# Patient Record
Sex: FEMALE | Race: BLACK OR AFRICAN AMERICAN | NOT HISPANIC OR LATINO | Employment: OTHER | URBAN - METROPOLITAN AREA
[De-identification: names, ages, dates, MRNs, and addresses within clinical notes are randomized per-mention and may not be internally consistent; named-entity substitution may affect disease eponyms.]

---

## 2023-02-06 PROBLEM — I10 HYPERTENSION: Status: ACTIVE | Noted: 2023-02-06

## 2023-02-06 PROBLEM — E55.9 MILD VITAMIN D DEFICIENCY: Status: ACTIVE | Noted: 2023-02-06

## 2023-02-06 PROBLEM — H40.1131 PRIMARY OPEN ANGLE GLAUCOMA (POAG) OF BOTH EYES, MILD STAGE: Status: ACTIVE | Noted: 2023-02-06

## 2023-02-06 PROBLEM — E11.9 TYPE 2 DIABETES MELLITUS (MULTI): Status: ACTIVE | Noted: 2023-02-06

## 2023-02-06 PROBLEM — M46.1 SACROILIAC INFLAMMATION (CMS-HCC): Status: ACTIVE | Noted: 2023-02-06

## 2023-02-06 PROBLEM — R07.9 CHEST PAIN: Status: ACTIVE | Noted: 2023-02-06

## 2023-02-06 PROBLEM — H04.123 DRY EYES: Status: ACTIVE | Noted: 2023-02-06

## 2023-02-06 PROBLEM — H26.9 CATARACT: Status: ACTIVE | Noted: 2023-02-06

## 2023-02-06 PROBLEM — H40.059 OHT (OCULAR HYPERTENSION): Status: ACTIVE | Noted: 2023-02-06

## 2023-02-06 PROBLEM — M25.512 LEFT SHOULDER PAIN: Status: ACTIVE | Noted: 2023-02-06

## 2023-02-06 PROBLEM — E11.9 DIABETES MELLITUS TYPE 2 WITHOUT RETINOPATHY (MULTI): Status: ACTIVE | Noted: 2023-02-06

## 2023-02-06 PROBLEM — R06.02 EXERTIONAL SHORTNESS OF BREATH: Status: ACTIVE | Noted: 2023-02-06

## 2023-02-06 PROBLEM — Z96.1 BILATERAL PSEUDOPHAKIA: Status: ACTIVE | Noted: 2023-02-06

## 2023-02-06 PROBLEM — R20.2 PARESTHESIA OF BOTH HANDS: Status: ACTIVE | Noted: 2023-02-06

## 2023-02-06 PROBLEM — H43.813 PVD (POSTERIOR VITREOUS DETACHMENT), BOTH EYES: Status: ACTIVE | Noted: 2023-02-06

## 2023-02-06 PROBLEM — R49.0 DYSPHONIA: Status: ACTIVE | Noted: 2023-02-06

## 2023-02-06 PROBLEM — E01.0 THYROMEGALY: Status: ACTIVE | Noted: 2023-02-06

## 2023-02-06 PROBLEM — H25.813 COMBINED FORM OF AGE-RELATED CATARACT, BOTH EYES: Status: ACTIVE | Noted: 2023-02-06

## 2023-02-06 PROBLEM — H25.813 MIXED TYPE AGE-RELATED CATARACT, BOTH EYES: Status: ACTIVE | Noted: 2023-02-06

## 2023-02-06 PROBLEM — K21.9 ESOPHAGEAL REFLUX: Status: ACTIVE | Noted: 2023-02-06

## 2023-02-06 PROBLEM — N64.4 BREAST PAIN, LEFT: Status: ACTIVE | Noted: 2023-02-06

## 2023-02-06 PROBLEM — H43.393 VITREOUS SYNERESIS OF BOTH EYES: Status: ACTIVE | Noted: 2023-02-06

## 2023-02-06 PROBLEM — H40.003 GLAUCOMA SUSPECT, BOTH EYES: Status: ACTIVE | Noted: 2023-02-06

## 2023-02-06 PROBLEM — H43.813 POSTERIOR VITREOUS DETACHMENT OF BOTH EYES: Status: ACTIVE | Noted: 2023-02-06

## 2023-02-06 PROBLEM — E78.5 HYPERLIPIDEMIA LDL GOAL <100: Status: ACTIVE | Noted: 2023-02-06

## 2023-02-06 PROBLEM — R53.83 FATIGUE: Status: ACTIVE | Noted: 2023-02-06

## 2023-02-06 PROBLEM — J45.909 ASTHMA (HHS-HCC): Status: ACTIVE | Noted: 2023-02-06

## 2023-02-06 RX ORDER — PANTOPRAZOLE SODIUM 40 MG/1
1 TABLET, DELAYED RELEASE ORAL DAILY
COMMUNITY
Start: 2021-03-23 | End: 2023-05-01

## 2023-02-06 RX ORDER — SIMVASTATIN 20 MG/1
1 TABLET, FILM COATED ORAL DAILY
COMMUNITY
Start: 2015-09-16 | End: 2023-05-01

## 2023-02-06 RX ORDER — LISINOPRIL AND HYDROCHLOROTHIAZIDE 20; 25 MG/1; MG/1
1 TABLET ORAL DAILY
COMMUNITY
Start: 2016-01-13 | End: 2023-05-12 | Stop reason: SDUPTHER

## 2023-02-06 RX ORDER — DULAGLUTIDE 1.5 MG/.5ML
1.5 INJECTION, SOLUTION SUBCUTANEOUS
COMMUNITY
Start: 2022-05-18 | End: 2023-11-13 | Stop reason: DRUGHIGH

## 2023-02-06 RX ORDER — PEN NEEDLE, DIABETIC 30 GX3/16"
1 NEEDLE, DISPOSABLE MISCELLANEOUS DAILY
COMMUNITY

## 2023-02-06 RX ORDER — LANCETS 33 GAUGE
1 EACH MISCELLANEOUS 3 TIMES DAILY
COMMUNITY

## 2023-02-06 RX ORDER — BLOOD SUGAR DIAGNOSTIC
1 STRIP MISCELLANEOUS 3 TIMES DAILY
COMMUNITY
Start: 2021-09-15 | End: 2023-10-18

## 2023-02-06 RX ORDER — INSULIN GLARGINE 100 [IU]/ML
10 INJECTION, SOLUTION SUBCUTANEOUS DAILY
COMMUNITY

## 2023-02-06 RX ORDER — CYCLOBENZAPRINE HCL 10 MG
10 TABLET ORAL NIGHTLY
COMMUNITY
End: 2023-07-18

## 2023-02-06 RX ORDER — METFORMIN HYDROCHLORIDE 1000 MG/1
1 TABLET ORAL
COMMUNITY
Start: 2016-01-13 | End: 2023-10-25 | Stop reason: SDUPTHER

## 2023-02-06 RX ORDER — ASPIRIN 81 MG/1
1 TABLET ORAL DAILY
COMMUNITY
Start: 2016-01-13

## 2023-02-06 RX ORDER — GLIPIZIDE 10 MG/1
10 TABLET ORAL
COMMUNITY
Start: 2016-01-13

## 2023-02-06 RX ORDER — PEN NEEDLE, DIABETIC 30 GX3/16"
1 NEEDLE, DISPOSABLE MISCELLANEOUS DAILY
COMMUNITY
End: 2023-10-25 | Stop reason: SDUPTHER

## 2023-02-06 RX ORDER — ACETAMINOPHEN 500 MG
TABLET ORAL
COMMUNITY
Start: 2020-06-18

## 2023-02-06 RX ORDER — LATANOPROST 50 UG/ML
1 SOLUTION/ DROPS OPHTHALMIC NIGHTLY
COMMUNITY
Start: 2022-03-11 | End: 2023-10-19 | Stop reason: SDUPTHER

## 2023-02-06 RX ORDER — ALBUTEROL SULFATE 90 UG/1
2 AEROSOL, METERED RESPIRATORY (INHALATION) EVERY 4 HOURS PRN
COMMUNITY
Start: 2016-01-13 | End: 2023-07-18 | Stop reason: SDUPTHER

## 2023-02-06 RX ORDER — AMLODIPINE BESYLATE 10 MG/1
1 TABLET ORAL DAILY
COMMUNITY
Start: 2016-01-13 | End: 2023-03-16

## 2023-02-06 RX ORDER — LIDOCAINE 50 MG/G
PATCH TOPICAL
COMMUNITY

## 2023-02-06 RX ORDER — METOPROLOL SUCCINATE 50 MG/1
50 TABLET, EXTENDED RELEASE ORAL DAILY
COMMUNITY
End: 2023-07-06

## 2023-03-12 DIAGNOSIS — I10 PRIMARY HYPERTENSION: Primary | ICD-10-CM

## 2023-03-16 RX ORDER — AMLODIPINE BESYLATE 10 MG/1
TABLET ORAL
Qty: 90 TABLET | Refills: 0 | Status: SHIPPED | OUTPATIENT
Start: 2023-03-16 | End: 2023-06-12

## 2023-03-20 ENCOUNTER — APPOINTMENT (OUTPATIENT)
Dept: PRIMARY CARE | Facility: CLINIC | Age: 78
End: 2023-03-20
Payer: MEDICARE

## 2023-04-29 DIAGNOSIS — E78.5 HYPERLIPIDEMIA LDL GOAL <100: Primary | ICD-10-CM

## 2023-04-29 DIAGNOSIS — K21.9 GASTROESOPHAGEAL REFLUX DISEASE, UNSPECIFIED WHETHER ESOPHAGITIS PRESENT: ICD-10-CM

## 2023-05-01 RX ORDER — SIMVASTATIN 20 MG/1
TABLET, FILM COATED ORAL
Qty: 90 TABLET | Refills: 0 | Status: SHIPPED | OUTPATIENT
Start: 2023-05-01 | End: 2023-07-31 | Stop reason: SDUPTHER

## 2023-05-01 RX ORDER — PANTOPRAZOLE SODIUM 40 MG/1
TABLET, DELAYED RELEASE ORAL
Qty: 90 TABLET | Refills: 0 | Status: SHIPPED | OUTPATIENT
Start: 2023-05-01 | End: 2023-07-31 | Stop reason: SDUPTHER

## 2023-05-12 DIAGNOSIS — I10 HYPERTENSION, UNSPECIFIED TYPE: Primary | ICD-10-CM

## 2023-05-12 RX ORDER — LISINOPRIL AND HYDROCHLOROTHIAZIDE 20; 25 MG/1; MG/1
1 TABLET ORAL DAILY
Qty: 90 TABLET | Refills: 0 | Status: SHIPPED | OUTPATIENT
Start: 2023-05-12 | End: 2023-07-31 | Stop reason: SDUPTHER

## 2023-05-18 LAB
ALBUMIN (MG/L) IN URINE: 19.9 MG/L
ALBUMIN/CREATININE (UG/MG) IN URINE: 17 UG/MG CRT (ref 0–30)
CREATININE (MG/DL) IN URINE: 117 MG/DL (ref 20–320)
ESTIMATED AVERAGE GLUCOSE FOR HBA1C: 186 MG/DL
HEMOGLOBIN A1C/HEMOGLOBIN TOTAL IN BLOOD: 8.1 %

## 2023-06-12 DIAGNOSIS — I10 PRIMARY HYPERTENSION: ICD-10-CM

## 2023-06-12 RX ORDER — AMLODIPINE BESYLATE 10 MG/1
TABLET ORAL
Qty: 90 TABLET | Refills: 0 | Status: SHIPPED | OUTPATIENT
Start: 2023-06-12 | End: 2023-09-11

## 2023-07-04 DIAGNOSIS — I10 PRIMARY HYPERTENSION: Primary | ICD-10-CM

## 2023-07-06 RX ORDER — METOPROLOL SUCCINATE 50 MG/1
TABLET, EXTENDED RELEASE ORAL
Qty: 90 TABLET | Refills: 0 | Status: SHIPPED | OUTPATIENT
Start: 2023-07-06 | End: 2023-07-18 | Stop reason: SDUPTHER

## 2023-07-18 ENCOUNTER — OFFICE VISIT (OUTPATIENT)
Dept: PRIMARY CARE | Facility: CLINIC | Age: 78
End: 2023-07-18
Payer: MEDICARE

## 2023-07-18 VITALS
TEMPERATURE: 97.6 F | RESPIRATION RATE: 16 BRPM | HEIGHT: 62 IN | OXYGEN SATURATION: 94 % | DIASTOLIC BLOOD PRESSURE: 62 MMHG | WEIGHT: 181 LBS | SYSTOLIC BLOOD PRESSURE: 108 MMHG | HEART RATE: 70 BPM | BODY MASS INDEX: 33.31 KG/M2

## 2023-07-18 DIAGNOSIS — J45.20 MILD INTERMITTENT ASTHMA WITHOUT COMPLICATION (HHS-HCC): ICD-10-CM

## 2023-07-18 DIAGNOSIS — E78.5 HYPERLIPIDEMIA LDL GOAL <100: Primary | ICD-10-CM

## 2023-07-18 DIAGNOSIS — E11.9 DIABETES MELLITUS TYPE 2 WITHOUT RETINOPATHY (MULTI): ICD-10-CM

## 2023-07-18 DIAGNOSIS — I10 PRIMARY HYPERTENSION: ICD-10-CM

## 2023-07-18 DIAGNOSIS — Z12.31 ENCOUNTER FOR SCREENING MAMMOGRAM FOR MALIGNANT NEOPLASM OF BREAST: ICD-10-CM

## 2023-07-18 DIAGNOSIS — G47.33 OSA (OBSTRUCTIVE SLEEP APNEA): ICD-10-CM

## 2023-07-18 DIAGNOSIS — Z12.11 ENCOUNTER FOR SCREENING FOR MALIGNANT NEOPLASM OF COLON: ICD-10-CM

## 2023-07-18 PROBLEM — R00.0 TACHYCARDIA: Status: ACTIVE | Noted: 2023-07-18

## 2023-07-18 PROBLEM — M62.830 SPASM OF THORACIC BACK MUSCLE: Status: ACTIVE | Noted: 2023-07-18

## 2023-07-18 PROCEDURE — 1126F AMNT PAIN NOTED NONE PRSNT: CPT | Performed by: INTERNAL MEDICINE

## 2023-07-18 PROCEDURE — 3074F SYST BP LT 130 MM HG: CPT | Performed by: INTERNAL MEDICINE

## 2023-07-18 PROCEDURE — 1170F FXNL STATUS ASSESSED: CPT | Performed by: INTERNAL MEDICINE

## 2023-07-18 PROCEDURE — G0439 PPPS, SUBSEQ VISIT: HCPCS | Performed by: INTERNAL MEDICINE

## 2023-07-18 PROCEDURE — 3078F DIAST BP <80 MM HG: CPT | Performed by: INTERNAL MEDICINE

## 2023-07-18 PROCEDURE — 99397 PER PM REEVAL EST PAT 65+ YR: CPT | Performed by: INTERNAL MEDICINE

## 2023-07-18 PROCEDURE — 1036F TOBACCO NON-USER: CPT | Performed by: INTERNAL MEDICINE

## 2023-07-18 RX ORDER — METOPROLOL SUCCINATE 50 MG/1
50 TABLET, EXTENDED RELEASE ORAL DAILY
Qty: 90 TABLET | Refills: 1 | Status: SHIPPED | OUTPATIENT
Start: 2023-07-18 | End: 2023-10-05 | Stop reason: SDUPTHER

## 2023-07-18 RX ORDER — FLUTICASONE PROPIONATE 50 MCG
2 SPRAY, SUSPENSION (ML) NASAL DAILY
COMMUNITY
Start: 2014-05-15

## 2023-07-18 RX ORDER — ALBUTEROL SULFATE 90 UG/1
2 AEROSOL, METERED RESPIRATORY (INHALATION) EVERY 4 HOURS PRN
Qty: 18 G | Refills: 11 | Status: SHIPPED | OUTPATIENT
Start: 2023-07-18

## 2023-07-18 RX ORDER — DEXTROSE 4 G
TABLET,CHEWABLE ORAL
COMMUNITY
Start: 2015-04-21 | End: 2023-10-25 | Stop reason: ALTCHOICE

## 2023-07-18 ASSESSMENT — ENCOUNTER SYMPTOMS
BLOOD IN STOOL: 0
CONSTIPATION: 0
OCCASIONAL FEELINGS OF UNSTEADINESS: 0
VOMITING: 0
LIGHT-HEADEDNESS: 0
NECK PAIN: 0
SINUS PRESSURE: 0
BACK PAIN: 0
CHILLS: 0
MYALGIAS: 0
NUMBNESS: 0
HEMATURIA: 0
LOSS OF SENSATION IN FEET: 0
DIFFICULTY URINATING: 0
ARTHRALGIAS: 0
SORE THROAT: 0
COUGH: 0
FEVER: 0
SHORTNESS OF BREATH: 0
NAUSEA: 0
NECK STIFFNESS: 0
JOINT SWELLING: 0
HEADACHES: 0
FREQUENCY: 0
PALPITATIONS: 0
ABDOMINAL PAIN: 0
DIAPHORESIS: 0
DYSURIA: 0
WHEEZING: 0
FATIGUE: 0
DIARRHEA: 0
APPETITE CHANGE: 0
WEAKNESS: 0
ABDOMINAL DISTENTION: 0
DEPRESSION: 0
RHINORRHEA: 0
DIZZINESS: 0

## 2023-07-18 ASSESSMENT — PATIENT HEALTH QUESTIONNAIRE - PHQ9
2. FEELING DOWN, DEPRESSED OR HOPELESS: NOT AT ALL
2. FEELING DOWN, DEPRESSED OR HOPELESS: NOT AT ALL
1. LITTLE INTEREST OR PLEASURE IN DOING THINGS: NOT AT ALL
SUM OF ALL RESPONSES TO PHQ9 QUESTIONS 1 AND 2: 0
SUM OF ALL RESPONSES TO PHQ9 QUESTIONS 1 AND 2: 0
1. LITTLE INTEREST OR PLEASURE IN DOING THINGS: NOT AT ALL

## 2023-07-18 ASSESSMENT — ACTIVITIES OF DAILY LIVING (ADL)
BATHING: INDEPENDENT
MANAGING_FINANCES: INDEPENDENT
DOING_HOUSEWORK: INDEPENDENT
GROCERY_SHOPPING: INDEPENDENT
TAKING_MEDICATION: INDEPENDENT
DRESSING: INDEPENDENT

## 2023-07-18 NOTE — ASSESSMENT & PLAN NOTE
Diabetes Mellitus Type II  - HbA1C above target goal 7%  - Monitor HbA1C, renal function  - Continue current antidiabetics   -Make apt with Ophthalmology  -Educate about adverse effects of uncontrolled DMT2 and its complications, self glucose monitoring and diary keeping to bring to the next visit

## 2023-07-18 NOTE — PROGRESS NOTES
"Subjective   Patient ID: Nii Colunga is a 77 y.o. female who presents for Follow-up (Medicare Wellness visit).    HPI   She has no new medical complaints.       Review of Systems   Constitutional:  Negative for appetite change, chills, diaphoresis, fatigue and fever.   HENT:  Negative for congestion, ear discharge, ear pain, hearing loss, postnasal drip, rhinorrhea, sinus pressure, sore throat and tinnitus.    Eyes:  Negative for visual disturbance.   Respiratory:  Negative for cough, shortness of breath and wheezing.    Cardiovascular:  Negative for chest pain, palpitations and leg swelling.   Gastrointestinal:  Negative for abdominal distention, abdominal pain, blood in stool, constipation, diarrhea, nausea and vomiting.   Genitourinary:  Negative for decreased urine volume, difficulty urinating, dysuria, frequency, hematuria and urgency.   Musculoskeletal:  Negative for arthralgias, back pain, gait problem, joint swelling, myalgias, neck pain and neck stiffness.   Skin:  Negative for rash.   Neurological:  Negative for dizziness, weakness, light-headedness, numbness and headaches.         Objective   /62 (BP Location: Right arm, Patient Position: Sitting, BP Cuff Size: Adult)   Pulse 70   Temp 36.4 °C (97.6 °F) (Oral)   Resp 16   Ht 1.575 m (5' 2\")   Wt 82.1 kg (181 lb)   SpO2 94%   BMI 33.11 kg/m²     Physical Exam  Vitals reviewed.   Constitutional:       Appearance: Normal appearance. She is normal weight.   HENT:      Right Ear: Tympanic membrane and external ear normal.      Left Ear: Tympanic membrane and external ear normal.   Eyes:      Extraocular Movements: Extraocular movements intact.      Conjunctiva/sclera: Conjunctivae normal.      Pupils: Pupils are equal, round, and reactive to light.   Cardiovascular:      Rate and Rhythm: Normal rate and regular rhythm.      Pulses: Normal pulses.   Pulmonary:      Effort: Pulmonary effort is normal.      Breath sounds: Normal breath sounds. "   Abdominal:      General: Bowel sounds are normal.      Palpations: Abdomen is soft.   Musculoskeletal:         General: Normal range of motion.      Cervical back: Normal range of motion.   Skin:     General: Skin is warm and dry.   Neurological:      General: No focal deficit present.      Mental Status: She is oriented to person, place, and time.   Psychiatric:         Mood and Affect: Mood normal.         Behavior: Behavior normal.           Assessment/Plan   Problem List Items Addressed This Visit       Asthma    Relevant Medications    albuterol (ProAir HFA) 90 mcg/actuation inhaler    Diabetes mellitus type 2 without retinopathy (CMS/HCC)     Diabetes Mellitus Type II  - HbA1C above target goal 7%  - Monitor HbA1C, renal function  - Continue current antidiabetics   -Make apt with Ophthalmology  -Educate about adverse effects of uncontrolled DMT2 and its complications, self glucose monitoring and diary keeping to bring to the next visit           Relevant Orders    Comprehensive Metabolic Panel    Hyperlipidemia LDL goal <100 - Primary    Relevant Orders    Lipid Panel    Hypertension     -BP below target goal 130/80  -CMP, TSH ordered  -Continue current antihypertensives  -Educated about adverse effects of uncontrolled blood pressure, importance of self blood pressure monitoring and bring to next visit.  -Counselled low sodium diet, mediterranean diet and exercise            Relevant Medications    metoprolol succinate XL (Toprol-XL) 50 mg 24 hr tablet    Other Relevant Orders    CBC and Auto Differential    Comprehensive Metabolic Panel    TSH with reflex to Free T4 if abnormal     Other Visit Diagnoses       Encounter for screening mammogram for malignant neoplasm of breast        Relevant Orders    BI mammo bilateral screening tomosynthesis    Encounter for screening for malignant neoplasm of colon        Relevant Orders    Colonoscopy    DARIANA (obstructive sleep apnea)        Relevant Orders    In-Center  Sleep Study (Non-Sleep Provider Only)          RTC in 6 mo

## 2023-07-18 NOTE — ASSESSMENT & PLAN NOTE
-BP below target goal 130/80  -CMP, TSH ordered  -Continue current antihypertensives  -Educated about adverse effects of uncontrolled blood pressure, importance of self blood pressure monitoring and bring to next visit.  -Counselled low sodium diet, mediterranean diet and exercise

## 2023-07-27 ENCOUNTER — LAB (OUTPATIENT)
Dept: LAB | Facility: LAB | Age: 78
End: 2023-07-27
Payer: MEDICARE

## 2023-07-27 DIAGNOSIS — E78.5 HYPERLIPIDEMIA LDL GOAL <100: ICD-10-CM

## 2023-07-27 DIAGNOSIS — I10 PRIMARY HYPERTENSION: ICD-10-CM

## 2023-07-27 DIAGNOSIS — E11.9 DIABETES MELLITUS TYPE 2 WITHOUT RETINOPATHY (MULTI): ICD-10-CM

## 2023-07-27 LAB
ALANINE AMINOTRANSFERASE (SGPT) (U/L) IN SER/PLAS: 14 U/L (ref 7–45)
ALBUMIN (G/DL) IN SER/PLAS: 4.5 G/DL (ref 3.4–5)
ALKALINE PHOSPHATASE (U/L) IN SER/PLAS: 58 U/L (ref 33–136)
ANION GAP IN SER/PLAS: 9 MMOL/L (ref 10–20)
ASPARTATE AMINOTRANSFERASE (SGOT) (U/L) IN SER/PLAS: 15 U/L (ref 9–39)
BASOPHILS (10*3/UL) IN BLOOD BY AUTOMATED COUNT: 0.06 X10E9/L (ref 0–0.1)
BASOPHILS/100 LEUKOCYTES IN BLOOD BY AUTOMATED COUNT: 0.8 % (ref 0–2)
BILIRUBIN TOTAL (MG/DL) IN SER/PLAS: 0.3 MG/DL (ref 0–1.2)
CALCIUM (MG/DL) IN SER/PLAS: 10.4 MG/DL (ref 8.6–10.6)
CARBON DIOXIDE, TOTAL (MMOL/L) IN SER/PLAS: 32 MMOL/L (ref 21–32)
CHLORIDE (MMOL/L) IN SER/PLAS: 101 MMOL/L (ref 98–107)
CHOLESTEROL (MG/DL) IN SER/PLAS: 114 MG/DL (ref 0–199)
CHOLESTEROL IN HDL (MG/DL) IN SER/PLAS: 46.4 MG/DL
CHOLESTEROL/HDL RATIO: 2.5
CREATININE (MG/DL) IN SER/PLAS: 1.08 MG/DL (ref 0.5–1.05)
EOSINOPHILS (10*3/UL) IN BLOOD BY AUTOMATED COUNT: 0.12 X10E9/L (ref 0–0.4)
EOSINOPHILS/100 LEUKOCYTES IN BLOOD BY AUTOMATED COUNT: 1.6 % (ref 0–6)
ERYTHROCYTE DISTRIBUTION WIDTH (RATIO) BY AUTOMATED COUNT: 14.5 % (ref 11.5–14.5)
ERYTHROCYTE MEAN CORPUSCULAR HEMOGLOBIN CONCENTRATION (G/DL) BY AUTOMATED: 31.4 G/DL (ref 32–36)
ERYTHROCYTE MEAN CORPUSCULAR VOLUME (FL) BY AUTOMATED COUNT: 86 FL (ref 80–100)
ERYTHROCYTES (10*6/UL) IN BLOOD BY AUTOMATED COUNT: 4.69 X10E12/L (ref 4–5.2)
GFR FEMALE: 53 ML/MIN/1.73M2
GLUCOSE (MG/DL) IN SER/PLAS: 156 MG/DL (ref 74–99)
HEMATOCRIT (%) IN BLOOD BY AUTOMATED COUNT: 40.4 % (ref 36–46)
HEMOGLOBIN (G/DL) IN BLOOD: 12.7 G/DL (ref 12–16)
IMMATURE GRANULOCYTES/100 LEUKOCYTES IN BLOOD BY AUTOMATED COUNT: 0.3 % (ref 0–0.9)
LDL: 48 MG/DL (ref 0–99)
LEUKOCYTES (10*3/UL) IN BLOOD BY AUTOMATED COUNT: 7.4 X10E9/L (ref 4.4–11.3)
LYMPHOCYTES (10*3/UL) IN BLOOD BY AUTOMATED COUNT: 2.4 X10E9/L (ref 0.8–3)
LYMPHOCYTES/100 LEUKOCYTES IN BLOOD BY AUTOMATED COUNT: 32.7 % (ref 13–44)
MONOCYTES (10*3/UL) IN BLOOD BY AUTOMATED COUNT: 0.4 X10E9/L (ref 0.05–0.8)
MONOCYTES/100 LEUKOCYTES IN BLOOD BY AUTOMATED COUNT: 5.4 % (ref 2–10)
NEUTROPHILS (10*3/UL) IN BLOOD BY AUTOMATED COUNT: 4.35 X10E9/L (ref 1.6–5.5)
NEUTROPHILS/100 LEUKOCYTES IN BLOOD BY AUTOMATED COUNT: 59.2 % (ref 40–80)
NRBC (PER 100 WBCS) BY AUTOMATED COUNT: 0 /100 WBC (ref 0–0)
PLATELETS (10*3/UL) IN BLOOD AUTOMATED COUNT: 336 X10E9/L (ref 150–450)
POTASSIUM (MMOL/L) IN SER/PLAS: 4.3 MMOL/L (ref 3.5–5.3)
PROTEIN TOTAL: 7.1 G/DL (ref 6.4–8.2)
SODIUM (MMOL/L) IN SER/PLAS: 138 MMOL/L (ref 136–145)
THYROTROPIN (MIU/L) IN SER/PLAS BY DETECTION LIMIT <= 0.05 MIU/L: 1.49 MIU/L (ref 0.44–3.98)
TRIGLYCERIDE (MG/DL) IN SER/PLAS: 98 MG/DL (ref 0–149)
UREA NITROGEN (MG/DL) IN SER/PLAS: 22 MG/DL (ref 6–23)
VLDL: 20 MG/DL (ref 0–40)

## 2023-07-27 PROCEDURE — 84443 ASSAY THYROID STIM HORMONE: CPT

## 2023-07-27 PROCEDURE — 80053 COMPREHEN METABOLIC PANEL: CPT

## 2023-07-27 PROCEDURE — 36415 COLL VENOUS BLD VENIPUNCTURE: CPT

## 2023-07-27 PROCEDURE — 85025 COMPLETE CBC W/AUTO DIFF WBC: CPT

## 2023-07-27 PROCEDURE — 80061 LIPID PANEL: CPT

## 2023-07-31 DIAGNOSIS — E78.5 HYPERLIPIDEMIA LDL GOAL <100: ICD-10-CM

## 2023-07-31 DIAGNOSIS — I10 HYPERTENSION, UNSPECIFIED TYPE: ICD-10-CM

## 2023-07-31 DIAGNOSIS — K21.9 GASTROESOPHAGEAL REFLUX DISEASE, UNSPECIFIED WHETHER ESOPHAGITIS PRESENT: ICD-10-CM

## 2023-08-02 RX ORDER — SIMVASTATIN 20 MG/1
20 TABLET, FILM COATED ORAL DAILY
Qty: 90 TABLET | Refills: 1 | Status: SHIPPED | OUTPATIENT
Start: 2023-08-02 | End: 2024-01-30 | Stop reason: SDUPTHER

## 2023-08-02 RX ORDER — LISINOPRIL AND HYDROCHLOROTHIAZIDE 20; 25 MG/1; MG/1
1 TABLET ORAL DAILY
Qty: 90 TABLET | Refills: 1 | Status: SHIPPED | OUTPATIENT
Start: 2023-08-02 | End: 2024-01-30 | Stop reason: SDUPTHER

## 2023-08-02 RX ORDER — PANTOPRAZOLE SODIUM 40 MG/1
40 TABLET, DELAYED RELEASE ORAL DAILY
Qty: 90 TABLET | Refills: 1 | Status: SHIPPED | OUTPATIENT
Start: 2023-08-02 | End: 2024-02-08 | Stop reason: SDUPTHER

## 2023-09-02 DIAGNOSIS — I10 PRIMARY HYPERTENSION: ICD-10-CM

## 2023-09-11 RX ORDER — AMLODIPINE BESYLATE 10 MG/1
TABLET ORAL
Qty: 90 TABLET | Refills: 1 | Status: SHIPPED | OUTPATIENT
Start: 2023-09-11 | End: 2024-03-06 | Stop reason: SDUPTHER

## 2023-09-20 PROBLEM — S92.253A NAVICULAR FRACTURE, FOOT: Status: ACTIVE | Noted: 2023-09-20

## 2023-09-20 PROBLEM — S82.892A CLOSED FRACTURE OF LEFT ANKLE: Status: ACTIVE | Noted: 2023-09-20

## 2023-09-20 PROBLEM — S83.90XA KNEE SPRAIN: Status: ACTIVE | Noted: 2023-09-20

## 2023-09-20 PROBLEM — M25.572 LEFT ANKLE PAIN: Status: ACTIVE | Noted: 2023-09-20

## 2023-09-20 PROBLEM — S92.255A CLOSED NONDISPLACED FRACTURE OF NAVICULAR BONE OF LEFT FOOT: Status: ACTIVE | Noted: 2023-09-20

## 2023-09-20 RX ORDER — CYCLOBENZAPRINE HCL 10 MG
10 TABLET ORAL NIGHTLY
COMMUNITY

## 2023-10-05 DIAGNOSIS — I10 PRIMARY HYPERTENSION: ICD-10-CM

## 2023-10-05 RX ORDER — METOPROLOL SUCCINATE 50 MG/1
50 TABLET, EXTENDED RELEASE ORAL DAILY
Qty: 90 TABLET | Refills: 0 | Status: SHIPPED | OUTPATIENT
Start: 2023-10-05 | End: 2024-01-08 | Stop reason: SDUPTHER

## 2023-10-09 PROBLEM — R26.2 DIFFICULTY WALKING DUE TO ANKLE AND FOOT JOINT: Status: ACTIVE | Noted: 2023-10-09

## 2023-10-09 NOTE — PROGRESS NOTES
Physical Therapy    Physical Therapy Evaluation    Patient Name: Nii Colunga  MRN: [id number]  Today's Date: 10/10/2023  Time Calculation  Start Time: 1015  Stop Time: 1115  Time Calculation (min): 60 min    Assessment  Patient presents with clinical signs and symptoms L ankle sprain and navicular avulsion fracture nondisplaced  with joint mobility restrictions. These impairments affect ADLs, work, recreational activity, exercise, transfer ability, ambulation, and sleep function that requires skilled PT intervention to resolve and enable patient to return to previous level of function. Factors that may affect progress in PT are medical co-morbidities and patient compliance.  Patient response to initial treatment of extensive education and ankle joint mobility was tolerated fair. Nii Colunga needs much encouragement to move L foot and ankle despite discomfort.      Subjective   General:   Nii Colunga stepped of curb 9/9/23 and had an inversion mechanism sprain and avulsion navicular Fx. She has been in L walking boot. She has weaned off the walker and is walking with boot on. She notes that she is swollen  lateral L ankle       Pain: minimal 1/10 at rest 5/10 with movement     Home Living: Living at home with        Prior Function Per Pt/Caregiver Report: All ADL and home care Independent. Rode bikes and walk for exercise.       Objective      Observation:  Brace    L walking Boot  Hip and knee joint clearance:  Clear    Single leg stance:  Eyes open     (L  unable)    Ankle ROM:  DF AROM  R 0   L -20 deg deg  PROM:  R 5 P L  deg, PF AROM:  R 45 L 30 deg PROM:  R  L 35 P deg, EV  PROM  R  100 %   L 50   %  ,   INV  PROM  R 100 %  L 25 %      Ankle Strength:  DF  R 5/5   L 3/5,  PF R  5/5  L 2/5, EV R  5/5  L  3-/5 P , INV  R 5 /5  L 3- /5    Accessory joint mobility: Talocrural  rearfoot  midfoot  metatarsal  1st MTP extension  all severely hypomobile     Palpation: tenderness to  anterior talofib  ligaments   entire dorsal tarsal group            Gait: antalgic  and compensated with boot on L foot   Swelling   circumference:  malleoli   R 25 cm  L 26 cm    dorsolateral foot swellling    Special Tests:   tilt test + L        anterior talar glide restricted         Outcome Measures:  LEFS score: 18    OP EDUCATION:   Educated regarding healing process and rehabilitation expectations over 2-3 month timeframe to recover function  PLAN  There ex, there act, cold/heat, gait training, manual therapy, self management, 1-2x/wk for up to 12 wks.   Plan made in agreement with Nii Colunga     Goals:    Increase  L ankle AROM to  WNL all directions  Increas strength to   4/5 MMT grade all directions  Decrease  L ankle x circumference 1 centimeters from baseline measurement  Eliminate compensatory gait pattern to normalize gait without device or boot on all reasonable community surfaces and steps  LEFS score improved by 10% points  6. independent Home exercise program

## 2023-10-10 ENCOUNTER — EVALUATION (OUTPATIENT)
Dept: PHYSICAL THERAPY | Facility: CLINIC | Age: 78
End: 2023-10-10
Payer: MEDICARE

## 2023-10-10 DIAGNOSIS — R26.2 DIFFICULTY WALKING DUE TO ANKLE AND FOOT JOINT: ICD-10-CM

## 2023-10-10 DIAGNOSIS — M25.572 ACUTE LEFT ANKLE PAIN: Primary | ICD-10-CM

## 2023-10-10 PROCEDURE — 97110 THERAPEUTIC EXERCISES: CPT | Mod: GP | Performed by: PHYSICAL THERAPIST

## 2023-10-10 PROCEDURE — 97162 PT EVAL MOD COMPLEX 30 MIN: CPT | Mod: GP | Performed by: PHYSICAL THERAPIST

## 2023-10-10 ASSESSMENT — ENCOUNTER SYMPTOMS
LOSS OF SENSATION IN FEET: 0
DEPRESSION: 0
OCCASIONAL FEELINGS OF UNSTEADINESS: 0

## 2023-10-10 NOTE — LETTER
October 10, 2023     Patient: Nii Colunga   YOB: 1945   Date of Visit: 10/10/2023       To Whom it May Concern:    Nii Colunga was seen in my clinic on 10/10/2023. She {Return to school/sport:22905}.    If you have any questions or concerns, please don't hesitate to call.         Sincerely,          Carlos Dominguez, PT        CC: No Recipients

## 2023-10-10 NOTE — LETTER
October 10, 2023     Patient: Nii Colunga   YOB: 1945   Date of Visit: 10/10/2023       To Whom It May Concern:    It is my medical opinion that Nii Colunga {Work release (duty restriction):26826}.    If you have any questions or concerns, please don't hesitate to call.         Sincerely,        Carlos Dominguez, PT    CC: No Recipients

## 2023-10-10 NOTE — LETTER
October 10, 2023      No Recipients    Patient: Nii Colunga   YOB: 1945   Date of Visit: 10/10/2023       Dear Dr. Goldman Recipients:    As you may recall, we have been seeing Nii Colunga for physical therapy of L ankle.    For therapy to continue, Medicare requires monthly physician review of the treatment plan. Please review the attached summary of the patient's progress and our plan for continued therapy, and verify  that you agree therapy should continue by signing the attached document and sending it back to our office.    If you have any questions or concerns, please don't hesitate to call.         Sincerely,        Carlos Dominguez, PT          CC:   No Recipients          Physical Therapy Medicare Recertification    Diagnosis: ***  ICD-9 Code: ***  Referring practitioner: ***  Date of last MD visit: ***  Date of initial OT Visit: ***  Patient seen for *** sessions      Subjective:  Patient's response to therapy: ***    Objective:  Current condition: ***  Test measurement: ***    Assessment:  Summary/analysis of evaluation: ***  Progress toward previous goals: { goal progress:5656679934}    Plan:    Goals  Short-term goals (STG): ***  Timeframe: The patient will {MISC; PT UE SHORT-TERM GOALS (STG):93464}  Long-term goals (LTG): ***  Timeframe: { timeframe:1450000004}  Prognosis to achieve goals: {GOOD/FAIR/POOR:64517}    Treatment  Principle of treatment/treatment today: { principal of tx:2494939777}  Treatment time: { tx time:6135610806}    Plan  Treatment plan with rationale: { tx plan:2741382778}  Recommendations: {MISC; PT RECOMMENDATIONS:52077}  Reason for plan status: { reason:1706493070}    Follow Up  Follow up in {numbers; 0-10:79851} {time units:11}      Based upon review of the patient's progress and continued therapy plan, it is my medical opinion that Nii Colunga should continue physical therapy treatment at the [facility]:    Signed:  _____________________________________________    {insert physician name}, MD

## 2023-10-17 ENCOUNTER — TREATMENT (OUTPATIENT)
Dept: PHYSICAL THERAPY | Facility: CLINIC | Age: 78
End: 2023-10-17
Payer: MEDICARE

## 2023-10-17 DIAGNOSIS — R26.2 DIFFICULTY WALKING DUE TO ANKLE AND FOOT JOINT: ICD-10-CM

## 2023-10-17 DIAGNOSIS — M25.572 LEFT ANKLE PAIN: Primary | ICD-10-CM

## 2023-10-17 PROCEDURE — 97110 THERAPEUTIC EXERCISES: CPT | Mod: GP,CQ

## 2023-10-17 NOTE — PROGRESS NOTES
"Physical Therapy    Physical Therapy Evaluation    Patient Name: Nii Colunga  MRN: [id number]  Today's Date: 10/17/2023  Time Calculation  Start Time: 0845  Stop Time: 0935  Time Calculation (min): 50 min    Assessment  Pt was able to progress AROM, and gentle strengthening of L ankle.  Will increase WB once cleared to do so.      Subjective   General:  \"L ankle is feeling good, just an occasional twinge.  Back Dr. Crawford on the 1st.  I am getting some L heel pain.\"     Pain: 0/10              Objective      Observation:  Brace    L walking Boot          Ankle ROM:  DF AROM  R 0   L -20 deg deg  PROM:  R 5 P L  deg, PF AROM:  R 45 L 30 deg PROM:  R  L 35 P deg, EV  PROM  R  100 %   L 50   %  ,   INV  PROM  R 100 %  L 25 %      Ankle Strength:  DF  R 5/5   L 3/5,  PF R  5/5  L 2/5, EV R  5/5  L  3-/5 P , INV  R 5 /5  L 3- /5    Treatment-L ankle AROM(AP's, CW, CCW, ABC's) x 20 ea, Supine Gastroc belt stretch 10x10\", Red band- PF, DF, INV x 20, Seated HS 10x10\", Toe curls x 20, Self arch mob w/ foam roller x 2 min, ice x 10 min    PLAN   Nii Colunga Slant board calf stretch, romel board, Nu step, mini squats, sit to stand    Goals:  Active       PT Problem       PT Goal 1       Start:  10/17/23              Increase  L ankle AROM to  WNL all directions  Increas strength to   4/5 MMT grade all directions  Decrease  L ankle x circumference 1 centimeters from baseline measurement  Eliminate compensatory gait pattern to normalize gait without device or boot on all reasonable community surfaces and steps  LEFS score improved by 10% points  6. independent Home exercise program        "

## 2023-10-18 ENCOUNTER — TELEPHONE (OUTPATIENT)
Dept: ENDOCRINOLOGY | Facility: CLINIC | Age: 78
End: 2023-10-18
Payer: MEDICARE

## 2023-10-18 DIAGNOSIS — E11.65 TYPE 2 DIABETES MELLITUS WITH HYPERGLYCEMIA, WITH LONG-TERM CURRENT USE OF INSULIN (MULTI): Primary | ICD-10-CM

## 2023-10-18 DIAGNOSIS — E11.65 TYPE 2 DIABETES MELLITUS WITH HYPERGLYCEMIA, UNSPECIFIED WHETHER LONG TERM INSULIN USE (MULTI): Primary | ICD-10-CM

## 2023-10-18 DIAGNOSIS — Z79.4 TYPE 2 DIABETES MELLITUS WITH HYPERGLYCEMIA, WITH LONG-TERM CURRENT USE OF INSULIN (MULTI): Primary | ICD-10-CM

## 2023-10-18 RX ORDER — BLOOD-GLUCOSE METER
EACH MISCELLANEOUS
Qty: 100 STRIP | Refills: 5 | Status: SHIPPED | OUTPATIENT
Start: 2023-10-18 | End: 2023-10-25 | Stop reason: ALTCHOICE

## 2023-10-18 NOTE — TELEPHONE ENCOUNTER
Juan Antonio Cochran faxed a request to renew One Touch test strips qty 100 testing 3 times daily for a 30 day supply

## 2023-10-19 ENCOUNTER — OFFICE VISIT (OUTPATIENT)
Dept: OPHTHALMOLOGY | Facility: CLINIC | Age: 78
End: 2023-10-19
Payer: MEDICARE

## 2023-10-19 DIAGNOSIS — H40.001 GLAUCOMA SUSPECT OF RIGHT EYE: ICD-10-CM

## 2023-10-19 DIAGNOSIS — H40.053 BILATERAL OCULAR HYPERTENSION: Primary | ICD-10-CM

## 2023-10-19 DIAGNOSIS — H40.002 GLAUCOMA SUSPECT OF LEFT EYE: ICD-10-CM

## 2023-10-19 PROCEDURE — 99214 OFFICE O/P EST MOD 30 MIN: CPT | Performed by: OPHTHALMOLOGY

## 2023-10-19 RX ORDER — LATANOPROST 50 UG/ML
1 SOLUTION/ DROPS OPHTHALMIC NIGHTLY
Qty: 4.5 ML | Refills: 3 | Status: SHIPPED | OUTPATIENT
Start: 2023-10-19 | End: 2024-03-18

## 2023-10-19 ASSESSMENT — TONOMETRY
IOP_METHOD: GOLDMANN APPLANATION
OD_IOP_MMHG: 19
OS_IOP_MMHG: 19

## 2023-10-19 ASSESSMENT — VISUAL ACUITY
OD_CC+: +2
CORRECTION_TYPE: GLASSES
METHOD: SNELLEN - LINEAR
OS_CC: 20/30
OD_CC: 20/40
OS_CC+: -2

## 2023-10-19 ASSESSMENT — EXTERNAL EXAM - LEFT EYE: OS_EXAM: NORMAL

## 2023-10-19 ASSESSMENT — EXTERNAL EXAM - RIGHT EYE: OD_EXAM: NORMAL

## 2023-10-19 ASSESSMENT — SLIT LAMP EXAM - LIDS
COMMENTS: NORMAL
COMMENTS: NORMAL

## 2023-10-19 NOTE — PROGRESS NOTES
glaucoma suspect, both eyes  ocular htn both eyes  tmax 28/29  no family hx  no trauma  gonio open  pach thick  HVF 24-2 (4/13/23): wnl OU, no glaucomatous defect  OCT RNFL (4/13/23): slight thinning OU from previous  overall no correlation between nerves, oct and field, no clear evidence of glaucoma on testing  s/pce/iol/abic OU 6/21 and 7/21 OS and OD  goal <20  IOP is great!  continue [facility]  RTC 6 months for Bower visual field (HVF) 24-2, dfe, oct rnfl

## 2023-10-20 ENCOUNTER — APPOINTMENT (OUTPATIENT)
Dept: PHYSICAL THERAPY | Facility: CLINIC | Age: 78
End: 2023-10-20
Payer: MEDICARE

## 2023-10-20 ENCOUNTER — TREATMENT (OUTPATIENT)
Dept: PHYSICAL THERAPY | Facility: CLINIC | Age: 78
End: 2023-10-20
Payer: MEDICARE

## 2023-10-20 DIAGNOSIS — R26.2 DIFFICULTY WALKING DUE TO ANKLE AND FOOT JOINT: ICD-10-CM

## 2023-10-20 DIAGNOSIS — M25.572 LEFT ANKLE PAIN: Primary | ICD-10-CM

## 2023-10-20 PROCEDURE — 97110 THERAPEUTIC EXERCISES: CPT | Mod: GP,CQ

## 2023-10-20 NOTE — PROGRESS NOTES
"Physical Therapy    Physical Therapy    Physical Therapy    Physical Therapy Evaluation    Patient Name: Nii Colunga  MRN: [id number]  Today's Date: 10/20/2023  Time Calculation  Start Time: 1100  Stop Time: 1145  Time Calculation (min): 45 min    Assessment  Pt was able to progress AROM, and gentle strengthening of L ankle.  Will increase WB once cleared to do so.      Subjective   General:  \"I was tired after last session, no pain, although the L ankle seems more swollen.  To Md for F/U 11/1/2023.\"  Pain: 0/10              Objective      Observation:  Brace    L walking Boot          Ankle ROM:  DF AROM  R 0   L -20 deg deg  PROM:  R 5 P L  deg, PF AROM:  R 45 L 30 deg PROM:  R  L 35 P deg, EV  PROM  R  100 %   L 50   %  ,   INV  PROM  R 100 %  L 25 %      Ankle Strength:  DF  R 5/5   L 3/5,  PF R  5/5  L 2/5, EV R  5/5  L  3-/5 P , INV  R 5 /5  L 3- /5    Treatment-L ankle AROM(AP's, CW, CCW, ABC's) x 20 ea on wedge for edema control, Supine Gastroc belt stretch 5x20\", Supine L ankle mobs for PF/ Gr. Toe extension x 7 min, S/L ev x 20, inv iso into ball x 20, Red band- PF, DF, INV x 20, Seated HS 10x10\", Toe curls x 20, Self arch mob w/ foam roller x 2 min.    PLAN   Nii Colunga Slant board calf stretch, romel board, Nu step, mini squats, sit to stand    Goals:  Active       PT Problem       PT Goal 1       Start:  10/17/23              Increase  L ankle AROM to  WNL all directions  Increas strength to   4/5 MMT grade all directions  Decrease  L ankle x circumference 1 centimeters from baseline measurement  Eliminate compensatory gait pattern to normalize gait without device or boot on all reasonable community surfaces and steps  LEFS score improved by 10% points  6. independent Home exercise program        "

## 2023-10-24 ENCOUNTER — TREATMENT (OUTPATIENT)
Dept: PHYSICAL THERAPY | Facility: CLINIC | Age: 78
End: 2023-10-24
Payer: MEDICARE

## 2023-10-24 DIAGNOSIS — R26.2 DIFFICULTY WALKING DUE TO ANKLE AND FOOT JOINT: ICD-10-CM

## 2023-10-24 DIAGNOSIS — M25.572 LEFT ANKLE PAIN: Primary | ICD-10-CM

## 2023-10-24 PROCEDURE — 97110 THERAPEUTIC EXERCISES: CPT | Mod: GP,CQ

## 2023-10-24 NOTE — PROGRESS NOTES
"Physical Therapy    Physical Therapy    Physical Therapy    Physical Therapy    Physical Therapy Evaluation    Patient Name: Nii Colunga  MRN: [id number]  Today's Date: 10/24/2023  Time Calculation  Start Time: 1030  Stop Time: 1115  Time Calculation (min): 45 min    Assessment  Improved function/technique of ex's today.  Pt reported some heel pain that was decreased w/ stretching    Subjective   General:  \"I feel really good in my ankle, and I think the swelling has lessened. To Md for F/U 11/1/2023.\"  Pain: 0/10              Objective      Observation:  Brace    L walking Boot          Ankle ROM:  DF AROM  R 0   L -20 deg deg  PROM:  R 5 P L  deg, PF AROM:  R 45 L 30 deg PROM:  R  L 35 P deg, EV  PROM  R  100 %   L 50   %  ,   INV  PROM  R 100 %  L 25 %      Ankle Strength:  DF  R 5/5   L 3/5,  PF R  5/5  L 2/5, EV R  5/5  L  3-/5 P , INV  R 5 /5  L 3- /5    Treatment-L ankle AROM(AP's, CW, CCW, ABC's) x 20 ea, Supine Gastroc belt stretch 5x20\", Supine L ankle mobs for PF/ Gr. Toe extension x 7 min, L ev w/ red loop x 20, inv iso into ball x 20, Red band- PF, DF x 20, Seated HS 10x10\", Toe curls x 20, Self arch mob w/ foam roller x 2 min, Seated TR x 20, L, Nu Step x 3 min    PLAN   Nii Colunga Slant board calf stretch, TR, romel board, mini squats, sit to stand    Goals:  Active       PT Problem       PT Goal 1       Start:  10/17/23              Increase  L ankle AROM to  WNL all directions  Increas strength to   4/5 MMT grade all directions  Decrease  L ankle x circumference 1 centimeters from baseline measurement  Eliminate compensatory gait pattern to normalize gait without device or boot on all reasonable community surfaces and steps  LEFS score improved by 10% points  6. independent Home exercise program        "

## 2023-10-25 ENCOUNTER — TELEPHONE (OUTPATIENT)
Dept: ENDOCRINOLOGY | Facility: CLINIC | Age: 78
End: 2023-10-25
Payer: MEDICARE

## 2023-10-25 DIAGNOSIS — E11.65 TYPE 2 DIABETES MELLITUS WITH HYPERGLYCEMIA, UNSPECIFIED WHETHER LONG TERM INSULIN USE (MULTI): Primary | ICD-10-CM

## 2023-10-25 RX ORDER — METFORMIN HYDROCHLORIDE 1000 MG/1
1000 TABLET ORAL
Qty: 180 TABLET | Refills: 0 | Status: SHIPPED | OUTPATIENT
Start: 2023-10-25 | End: 2024-01-10

## 2023-10-25 NOTE — TELEPHONE ENCOUNTER
Dr. Nunez pt:     [facility] called for renewal of Metformin  Last office visit:  06/12/23  Next office visit:  12/20/23

## 2023-10-27 NOTE — TELEPHONE ENCOUNTER
Robyn left a message that they want OneTouch Ultra and not OneTouch Verio. Please send a 90 day Rx if you agree

## 2023-10-30 NOTE — PROGRESS NOTES
Physical Therapy    Physical Therapy    Physical Therapy    Physical Therapy    Physical Therapy Evaluation    Patient Name: Nii Colunga  MRN: [id number]  Today's Date: 10/31/23Time Calculation  Start Time: 1015  Stop Time: 1100  Time Calculation (min): 45 min  Visit 6    Assessment  We progressed Nii Colunga today to increased Closed kinetic chain activities. She continues to have talocrural and midfoot joint hypomobility that require manual mobilization. Out of the boot her gait pattern is stiff and compensated. Sje needed much verbal cuing to properly heel strike with L ankle DF.    Subjective   General:  .pt  feels that she is continuing to get better . Still wearing boot. She C/o some heel pain secondary to iiritation from Monik Weber appointment for ankle follow up tomorrow.  Pain: 0/10          Objective      Observation:    wears  L walking Boot into clinic                               Treatment today without boot for all activities         Treatment-  There ex:L , , , L ev w/ red loop x 20, Red band- PF, DF x 20, ,  Self arch mob w/ foam roller x 2 min, Seated TR x 20, L, Nu Step x 6 min level 1.6 at 60 RPM, wobble board standing lateral and DF/PF 20x each, sit to stand hip hinge 2x5, standing gastrocsoleus stretch at wall  Manual Ther: Talocrural distraction, cuboid PA glide, 4th metatarsal PA glide, weightbearing MWM into DF 2x10 (15 min)    Access Code: [email or web address]  URL: [email or web address] [email or web address]  Date: 10/31/2023  Prepared by: Carlos Dominguez    Exercises  - Standing Gastroc Stretch  - 1 x daily - 7 x weekly - 1 sets - 3 reps - 30 hold    PLAN   Nnormalize weight bearin with Closed kinetic chain activities and gait pattern including TM walking  Goals:  Active       PT Problem       PT Goal 1       Start:  10/17/23              Increase  L ankle AROM to  WNL all directions  Increas strength to   4/5 MMT grade all directions  Decrease  L ankle x circumference 1 centimeters from baseline  measurement  Eliminate compensatory gait pattern to normalize gait without device or boot on all reasonable community surfaces and steps  LEFS score improved by 10% points  6. independent Home exercise program

## 2023-10-31 ENCOUNTER — TREATMENT (OUTPATIENT)
Dept: PHYSICAL THERAPY | Facility: CLINIC | Age: 78
End: 2023-10-31
Payer: MEDICARE

## 2023-10-31 DIAGNOSIS — R26.2 DIFFICULTY WALKING DUE TO ANKLE AND FOOT JOINT: Primary | ICD-10-CM

## 2023-10-31 DIAGNOSIS — M25.572 LEFT ANKLE PAIN: ICD-10-CM

## 2023-10-31 PROCEDURE — 97110 THERAPEUTIC EXERCISES: CPT | Mod: GP | Performed by: PHYSICAL THERAPIST

## 2023-10-31 PROCEDURE — 97140 MANUAL THERAPY 1/> REGIONS: CPT | Mod: GP | Performed by: PHYSICAL THERAPIST

## 2023-11-01 ENCOUNTER — OFFICE VISIT (OUTPATIENT)
Dept: ORTHOPEDIC SURGERY | Facility: CLINIC | Age: 78
End: 2023-11-01
Payer: MEDICARE

## 2023-11-01 ENCOUNTER — ANCILLARY PROCEDURE (OUTPATIENT)
Dept: RADIOLOGY | Facility: CLINIC | Age: 78
End: 2023-11-01
Payer: MEDICARE

## 2023-11-01 DIAGNOSIS — S82.892D CLOSED FRACTURE OF LEFT ANKLE WITH ROUTINE HEALING, SUBSEQUENT ENCOUNTER: Primary | ICD-10-CM

## 2023-11-01 DIAGNOSIS — S82.892D CLOSED FRACTURE OF LEFT ANKLE WITH ROUTINE HEALING, SUBSEQUENT ENCOUNTER: ICD-10-CM

## 2023-11-01 PROCEDURE — 3074F SYST BP LT 130 MM HG: CPT | Performed by: PHYSICIAN ASSISTANT

## 2023-11-01 PROCEDURE — 73610 X-RAY EXAM OF ANKLE: CPT | Mod: LT,FY

## 2023-11-01 PROCEDURE — 99024 POSTOP FOLLOW-UP VISIT: CPT | Performed by: PHYSICIAN ASSISTANT

## 2023-11-01 PROCEDURE — 3078F DIAST BP <80 MM HG: CPT | Performed by: PHYSICIAN ASSISTANT

## 2023-11-01 PROCEDURE — 1126F AMNT PAIN NOTED NONE PRSNT: CPT | Performed by: PHYSICIAN ASSISTANT

## 2023-11-01 PROCEDURE — 73610 X-RAY EXAM OF ANKLE: CPT | Mod: LEFT SIDE | Performed by: RADIOLOGY

## 2023-11-01 PROCEDURE — 1159F MED LIST DOCD IN RCRD: CPT | Performed by: PHYSICIAN ASSISTANT

## 2023-11-01 PROCEDURE — 1036F TOBACCO NON-USER: CPT | Performed by: PHYSICIAN ASSISTANT

## 2023-11-01 NOTE — PROGRESS NOTES
History of Present Illness   Nii Colunga is a 78 y.o. female presenting today for follow up from  L navicular avulsion fx after injury on 9/9/23. Overall doing well. Has minimal pain. Working with therapy and WB in her boot without difficulty. Denies numbness, tingling, f/c, n/v, CP, SOB, or any other complaints/concerns.      Past Medical History:   Diagnosis Date    Disorder of thyroid, unspecified 05/25/2016    Thyroid mass    Dry eye syndrome of bilateral lacrimal glands 09/13/2019    Bilateral dry eyes    Dry eye syndrome of bilateral lacrimal glands 09/13/2019    Bilateral dry eyes    Dry eye syndrome of bilateral lacrimal glands 09/13/2019    Bilateral dry eyes    Essential (primary) hypertension 08/23/2022    Hypertension    Personal history of malignant neoplasm of other parts of uterus 01/13/2016    History of cancer of uterus    Personal history of other endocrine, nutritional and metabolic disease     History of diabetes mellitus    Preglaucoma, unspecified, 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    Glaucoma suspect of both eyes    Preglaucoma, unspecified, 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    Glaucoma suspect of both eyes    Preglaucoma, unspecified, 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 Sig Documenting Provider Last Dose Status   acetaminophen (Tylenol Extra Strength) 500 mg tablet [id number]  TAKE 1 TABLET EVERY 4 TO 6 HOURS AS NEEDED. Historical Provider, MD  Active    albuterol (ProAir HFA) 90 mcg/actuation inhaler [id number]  Inhale 2 puffs every 4 hours if needed for shortness of breath. Owen Izquierdo MD  Active   amLODIPine (Norvasc) 10 mg tablet [id number]  Take 1 tablet by mouth once daily Owen Izquierdo MD  Active   aspirin 81 mg EC tablet [id number]  Take 1 tablet (81 mg) by mouth 1 (one) time each day. Denis Garcia MD  Active   aspirin-calcium carbonate 81 mg-300 mg calcium(777 mg) tablet [id number]  Take 1 tablet by mouth once daily. Denis Garcia MD  Active   blood-glucose meter misc [id number]  90 days supply of lancets to use once daily Denis Garcia MD  Active   cyclobenzaprine (Flexeril) 10 mg tablet [id number]  Take 1 tablet (10 mg) by mouth once daily at bedtime. Denis Garcia MD  Active   dulaglutide (Trulicity) 1.5 mg/0.5 mL pen injector [id number]  Inject 1.5 mg under the skin 1 (one) time per week. Denis Garcia MD  Active   fluticasone (Flonase) 50 mcg/actuation nasal spray [id number]  Administer 2 sprays into each nostril once daily. Denis Garcia MD  Active   glipiZIDE (Glucotrol) 10 mg tablet [id number]  Take 1 tablet (10 mg) by mouth before breakfast and before evening meal. Denis Garcia MD  Active   insulin glargine (Lantus U-100 Insulin) 100 unit/mL injection [id number]  Inject 10 Units under the skin in the morning. Take as directed per insulin instructions. Denis Garcia MD  Active   lancets (OneTouch Delica Lancets) 33 gauge misc [id number]  1 each in the morning and at bedtime. Denis Garcia MD  Active   latanoprost (Xalatan) 0.005 % ophthalmic solution [id number]  Administer 1 drop into both eyes at bedtime. Denis Garcia MD  Active   lidocaine (Lidoderm) 5 % patch [id number]  Use as directed on package Denis Garcia MD  Active   lisinopriL-hydrochlorothiazide 20-25 mg tablet [id number]  Take 1 tablet by mouth once daily. Owen Izquierdo MD  Active   metFORMIN (Glucophage) 1,000 mg tablet [id number]   "Take 1 tablet (1,000 mg) by mouth with breakfast and with evening meal. Historical Provider, MD  Active   metoprolol succinate XL (Toprol-XL) 50 mg 24 hr tablet [id number]  Take 1 tablet (50 mg) by mouth once daily. Do not crush or chew. Owen Izquierdo MD  Active   OMARTOUCH DELICA LANCETS MISC [id number]  Use BID for blood sugar test Historical Provider, MD  Active   OneTouch Ultra Test strip [id number]  1 each in the morning, at noon, and at bedtime. Historical Provider, MD  Active   pantoprazole (ProtoNix) 40 mg EC tablet [id number]  Take 1 tablet (40 mg) by mouth once daily. Do not crush, chew, or split. Owen Izquierdo MD  Active   pen needle, diabetic (BD Ultra-Fine Mini Pen Needle) 31 gauge x 3/16\" needle [id number]  1 each 1 (one) time each day. With insulin as directed Historical MD Jose  Active   pen needle, diabetic 32 gauge x 5/32\" needle [id number]  1 each 1 (one) time each day. Use with insulin Historical Provider, MD  Active   simvastatin (Zocor) 20 mg tablet [id number]  Take 1 tablet (20 mg) by mouth once daily. Owen Izquierdo MD  Active                    No Known Allergies    Social History     Socioeconomic History    Marital status:      Spouse name: Not on file    Number of children: Not on file    Years of education: Not on file    Highest education level: Not on file   Occupational History    Not on file   Tobacco Use    Smoking status: Never     Passive exposure: Never    Smokeless tobacco: Never   Substance and Sexual Activity    Alcohol use: Never    Drug use: Never    Sexual activity: Not on file   Other Topics Concern    Not on file   Social History Narrative    Not on file     Social Determinants of Health     Financial Resource Strain: Not on file   Food Insecurity: Not on file   Transportation Needs: Not on file   Physical Activity: Not on file   Stress: Not on file   Social Connections: Not on file   Intimate Partner Violence: Not on file   Housing Stability: Not on file       Past " Surgical History:   Procedure Laterality Date    HYSTERECTOMY  02/24/2016    Hysterectomy    THYROID SURGERY  04/05/2016    Thyroid Surgery    TUBAL LIGATION  01/13/2016    Tubal Ligation          Review of Systems:  30 point ROS reviewed and negative other than as listed in the HPI     Physical Exam:  Gen: The pt is A&Ox3, NAD, and appear state age and weight  Psychiatric: mood and affect are appropriate   Eyes: sclera are white, EOM grossly intact  ENT: MMM  Neck: supple, thyroid is midline  Respiratory: respirations are nonlabored, chest rise symmetric  CV: rate is regular by palpation of distal pulses  Abdomen: nondistended   Integument: no obvious cutaneous lesions noted. No signs of lymphangitis. No signs of systemic edema.   MSK:  L ankle with no ttp; skin over top of fx site c/d/I.  SILT throughout the leg intact. Intact plantarflexion and dorsiflexion. Foot warm and well perfused.       Imaging:  I personally reviewed multiple views of the  L ankle  were obtained in the office today demonstrate healing avulsion fx of the navicular bone     Assessment   78 y.o. female following up from avulsion fx of L navicular bone     Plan:  Continue WBAT on  LLE and wean boot as tolerated; continue therapy. Follow up as needed.    All of the patient's questions/concerns address and they are in agreement with the plan.

## 2023-11-01 NOTE — PROGRESS NOTES
Physical Therapy    Physical Therapy Evaluation    Patient Name: Nii Colunga  MRN: [id number]  Today's Date: 11/2/23  Visit 6    Assessment  Nii Colunga is now fully out of boot and she expresses fear of falling. She wanted to terminate the TM pacing exercise because the ground was moving under her and she felt uneasy about that. She did demo fairly good weight shift control and pain tolerance on the Biodex balance  staying within lines moving back and laterally R and L     Subjective   General:  Nii Colunga notes more stiff than pain L ankle. She totally out of boot since doc visit. Xray shows good bone healing     Objective      Observation:    out of  L walking Boot    Treatment-  There ex:L , , , L ev w/ red loop x 20,  green tband- PF, DF x 20, ,  , Seated TR x 20, L, Nu Step x 8 min level 1.6 at 60 RPM, wobble board standing lateral and DF/PF 20x each, sit to stand hip hinge 2x5, standing , Biodex balance  on weight shift mode moving lateral , fwd and back, TM pacing L LE 2 min at 1.4 MPH, cable retrowalk 3 pl 5 x     Manual Ther: Talocrural distraction, cuboid PA glide, 4th metatarsal PA glide, weightbearing MWM into DF 2x10 (15 min)    PLAN   Normalize weight bearin with Closed kinetic chain activities, balance and gait pattern including TM walking  Goals:      Increase  L ankle AROM to  WNL all directions  Increas strength to   4/5 MMT grade all directions  Decrease  L ankle x circumference 1 centimeters from baseline measurement  Eliminate compensatory gait pattern to normalize gait without device or boot on all reasonable community surfaces and steps  LEFS score improved by 10% points  6. independent Home exercise program

## 2023-11-02 ENCOUNTER — TREATMENT (OUTPATIENT)
Dept: PHYSICAL THERAPY | Facility: CLINIC | Age: 78
End: 2023-11-02
Payer: MEDICARE

## 2023-11-02 DIAGNOSIS — R26.2 DIFFICULTY WALKING DUE TO ANKLE AND FOOT JOINT: Primary | ICD-10-CM

## 2023-11-02 DIAGNOSIS — M25.572 LEFT ANKLE PAIN: ICD-10-CM

## 2023-11-02 PROCEDURE — 97110 THERAPEUTIC EXERCISES: CPT | Mod: GP | Performed by: PHYSICAL THERAPIST

## 2023-11-02 PROCEDURE — 97140 MANUAL THERAPY 1/> REGIONS: CPT | Mod: GP | Performed by: PHYSICAL THERAPIST

## 2023-11-06 ENCOUNTER — TREATMENT (OUTPATIENT)
Dept: PHYSICAL THERAPY | Facility: CLINIC | Age: 78
End: 2023-11-06
Payer: MEDICARE

## 2023-11-06 DIAGNOSIS — M25.572 LEFT ANKLE PAIN: ICD-10-CM

## 2023-11-06 DIAGNOSIS — R26.2 DIFFICULTY WALKING DUE TO ANKLE AND FOOT JOINT: Primary | ICD-10-CM

## 2023-11-06 PROCEDURE — 97530 THERAPEUTIC ACTIVITIES: CPT | Mod: GP | Performed by: PHYSICAL THERAPIST

## 2023-11-06 PROCEDURE — 97110 THERAPEUTIC EXERCISES: CPT | Mod: GP | Performed by: PHYSICAL THERAPIST

## 2023-11-06 PROCEDURE — 97140 MANUAL THERAPY 1/> REGIONS: CPT | Mod: GP | Performed by: PHYSICAL THERAPIST

## 2023-11-06 PROCEDURE — 97010 HOT OR COLD PACKS THERAPY: CPT | Mod: GP | Performed by: PHYSICAL THERAPIST

## 2023-11-06 NOTE — PROGRESS NOTES
"      Physical Therapy    Physical Therapy Evaluation    Patient Name: Nii Colunga  MRN: [id number]  Today's Date: 11/2/23  Visit 7    Assessment  Nii Colunga had a set back twisting ankle walking down steps and had an inversion strain to L ankle that has increased pain and is causing a limp. We backed off exercise intensity in clinic and focused on ankle joint mobility and pain reduction. She continues to be fearful of level change and unfamiliar situations. She needs much encouragement and cuing to normalize her gait pattern and perform exercise correctly  Subjective   Nii Colunga reports that she stepped down off a step and irritated her L ankle. More painful now 6/10  General:  Nii Colunga notes more stiff than pain L ankle. She totally out of boot since doc visit. Xray shows good bone healing     Objective      Observation:   Nii Colunga  is limping L LE    Exquisite tenderness lateral L ankle all lateral ligaments    Treatment-  There ex:L , , , L ev w/ red loop x 20,  red tband- PF, DF x 20, ,  , Seated TR x 20, L, Nu Step x 8 min level 1.6 at 60 RPM, wobble on green Perform Better disc sitting lateral and DF/PF 20x each, sit to stand hip hinge 2x5, standing ,    There act: steps 9 \" to identify optimal technique one step at a time. L foot lead up OK. L foot lead down required extensive verbal cuing correct performance and reduced pain    Manual Ther: Talocrural distraction, cuboid PA glide, 4th metatarsal PA glide, weightbearing MWM into DF 2x10 (15 min)  Modality:  Cold pack to L rosita-lateral ankle  PLAN   Goals:      Increase  L ankle AROM to  WNL all directions  Increas strength to   4/5 MMT grade all directions  Decrease  L ankle x circumference 1 centimeters from baseline measurement  Eliminate compensatory gait pattern to normalize gait without device or boot on all reasonable community surfaces and steps  LEFS score improved by 10% points  6. independent Home exercise program        "

## 2023-11-09 ENCOUNTER — TREATMENT (OUTPATIENT)
Dept: PHYSICAL THERAPY | Facility: CLINIC | Age: 78
End: 2023-11-09
Payer: MEDICARE

## 2023-11-09 DIAGNOSIS — R26.2 DIFFICULTY WALKING DUE TO ANKLE AND FOOT JOINT: Primary | ICD-10-CM

## 2023-11-09 DIAGNOSIS — M25.572 LEFT ANKLE PAIN: ICD-10-CM

## 2023-11-09 PROCEDURE — 97110 THERAPEUTIC EXERCISES: CPT | Mod: GP | Performed by: PHYSICAL THERAPIST

## 2023-11-09 PROCEDURE — 97530 THERAPEUTIC ACTIVITIES: CPT | Mod: GP | Performed by: PHYSICAL THERAPIST

## 2023-11-09 NOTE — PROGRESS NOTES
"      Physical Therapy    Physical Therapy Evaluation    Patient Name: Nii Colunga  MRN: [id number]  Today's Date: 11/9/23  Visit 8    Assessment  Nii Colunga has much less L ankle pain and her gait pattern is nearly normal. She continue to have difficulty stepping down steps with  L LE control. Also still fearful on threshold transitions and step overs  Subjective   Nii Colunga reports that she is doing better today. L ankle less painful today even though she was out shopping      Objective      Observation:   Nii Colunga  has more normalized gait pattern today secondary to less pain     Treatment-  There ex:L , , , L ev w/ red loop x 20,  red tband- PF, DF x 20, ,  , Seated TR x 20, L, Nu Step x 8 min level 1.6 at 60 RPM, wobble board DF/PF and lateral  and DF/PF 20x each, sit to stand hip hinge 2x5, standing ,  tball bounce 2 min. recumbent bike at lv 5 6 min 50 RPM   There act: steps 9 \" up  L foot lead up OK. L foot lead down off 4\" step                      Driss step overs 12 \" with SBA  4 x 20 ft    PLAN  Continue functional Closed kinetic chain progression    Goals:      Increase  L ankle AROM to  WNL all directions  Increas strength to   4/5 MMT grade all directions  Decrease  L ankle x circumference 1 centimeters from baseline measurement  Eliminate compensatory gait pattern to normalize gait without device or boot on all reasonable community surfaces and steps  LEFS score improved by 10% points  6. independent Home exercise program        "

## 2023-11-10 DIAGNOSIS — E11.65 TYPE 2 DIABETES MELLITUS WITH HYPERGLYCEMIA, UNSPECIFIED WHETHER LONG TERM INSULIN USE (MULTI): Primary | ICD-10-CM

## 2023-11-13 RX ORDER — DULAGLUTIDE 4.5 MG/.5ML
INJECTION, SOLUTION SUBCUTANEOUS
Qty: 2 ML | Refills: 0 | Status: SHIPPED | OUTPATIENT
Start: 2023-11-13 | End: 2023-12-27 | Stop reason: SDUPTHER

## 2023-11-16 NOTE — PROGRESS NOTES
"      Physical Therapy    Physical Therapy Evaluation    Patient Name: Nii Colunga  MRN: [id number]  Today's Date: 23  Visit 9    Assessment  Patient has achieved the goals of skilled PT and is ready for discharge to independent home program   Subjective   Nii Colunga reports that she is doing well. Able to take walks in neighborhood with minimal discomfort and more confidence in open spacesPhysical Therapy    Discharge Summary    Name: Nii Colunga  MRN: [id number]  : 1945  Date: 23    Discharge Summary: PT    Discharge Information: Date of discharge 23, Date of last visit 23, and Number of attended visits 9    Therapy Summary: see assessment summary    Rehab Discharge Reason: Achieved all and/or the most significant goals(s)      Objective     AROM: 8-50 deg, passive rear foot inversion to 75% full range is painful laterally at endrange  Strength: L ankle DF 4/5, EV 4/5, INV 4/5, PF  Gait: L ankle stiffness at start up but resolves a she takes several steps and normalizes one foot over other up and down steps  Swelling: circumference around malleoli 25.5 cm  LEFS score: 66   Observation:   Nii Colunga  has more normalized gait pattern      Treatment-  There ex:L , , , L ev w/ red loop x 20,  blue tband- PF, DF x 20, ,  , Seated TR x 20, L, Nu Step x 8 min level 1.6 at 60 RPM, wobble board DF/PF and lateral  and DF/PF 20x each, sit to stand hip hinge 2x5, standing ,     There act: steps 8 \" up  L foot lead up OK. L foot lead down off 8\" step                      Driss step overs 12 \" with SBA  4 x 20 ft    PLAN  Discharge to Home exercise program   Continue  Goals:      Increase  L ankle AROM to  WNL all directions  Increas strength to   4/5 MMT grade all directions  Decrease  L ankle x circumference 1 centimeters from baseline measurement  Eliminate compensatory gait pattern to normalize gait without device or boot on all reasonable community surfaces and steps  LEFS score " improved by 10% points  6. independent Home exercise program

## 2023-11-17 ENCOUNTER — TREATMENT (OUTPATIENT)
Dept: PHYSICAL THERAPY | Facility: CLINIC | Age: 78
End: 2023-11-17
Payer: MEDICARE

## 2023-11-17 DIAGNOSIS — R26.2 DIFFICULTY WALKING DUE TO ANKLE AND FOOT JOINT: Primary | ICD-10-CM

## 2023-11-17 DIAGNOSIS — M25.572 LEFT ANKLE PAIN: ICD-10-CM

## 2023-11-17 PROCEDURE — 97530 THERAPEUTIC ACTIVITIES: CPT | Mod: GP | Performed by: PHYSICAL THERAPIST

## 2023-11-17 PROCEDURE — 97110 THERAPEUTIC EXERCISES: CPT | Mod: GP | Performed by: PHYSICAL THERAPIST

## 2023-11-21 ENCOUNTER — APPOINTMENT (OUTPATIENT)
Dept: PHYSICAL THERAPY | Facility: CLINIC | Age: 78
End: 2023-11-21
Payer: MEDICARE

## 2023-12-15 NOTE — PROGRESS NOTES
[facility] for diabetes.     Last visit in clinic:  2023.    History of Present Illness  78 y.o. female with H/O type 2 diabetes, HTN, HLD.     Dx:   HbA1c: 6.7% (2023), 8.1% (2023), 8.2% (2023), 6.9% (2022), 7.7% (2022), 8.2% (2022), 8.5% (2021), 7.8% (2021)  Current regimen: glipizide 10 mg BID, metformin 1g BID, Lantus 15 units QHS, Trulicity 4.5 mg/week  Past medications: Januvia  Complications:none  Comorbidities: HTN, HLD     SMBG: twice day  Average of past 30 days: 140   Fasting: low 100s-120s  Bedtime: mid 200s     Hypoglycemia: no    SMB-2 times per day    23: 9:30 AM: 132  23: 10:25 am: 124  23: 9 AM: 107  23: 10 AM: 152  12/10/23: 10 AM: 176  12/10/23: 8 pm: 264  23: 9 pm: 227     Diet: low-carb , Usually 2 meals / day (breakfast and dinner)   Sometimes she skips a meal and drinks Glucerna   Snacks all day: nuts, chips, ice cream bar  Loves potatoes  Takes coffee with cream usually once a day, diet soda  Drinks juice very occasionally.      Exercise: 1 hour on the bike every other day    TODAY:  Came for follow up.   Denies any active complaints.   Reports that she usually skips her Lantus dose on  nights (She takes Trulicity every , skips night Lantus as she is afraid of hypoglycemia - AM FS on  are noted to be OK, not significantly high)     ROS  General: no fever or chills  CV: no chest pain   Respiratory: no shortness of breath  MSK: no lower extremity edema  Neuro: no headache or dizziness  See HPI for Endocrine ROS    Past Medical History:   Diagnosis Date    Disorder of thyroid, unspecified 2016    Thyroid mass    Dry eye syndrome of bilateral lacrimal glands 2019    Bilateral dry eyes    Dry eye syndrome of bilateral lacrimal glands 2019    Bilateral dry eyes    Dry eye syndrome of bilateral lacrimal glands 2019    Bilateral dry eyes    Essential (primary) hypertension 2022     Hypertension    Personal history of malignant neoplasm of other parts of uterus 01/13/2016    History of cancer of uterus    Personal history of other endocrine, nutritional and metabolic disease     History of diabetes mellitus    Preglaucoma, unspecified, 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    Glaucoma suspect of both eyes    Preglaucoma, unspecified, 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    Glaucoma suspect of both eyes    Preglaucoma, unspecified, 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    Glaucoma suspect of both eyes    Ulcerative blepharitis unspecified eye, 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/13/2019    Blepharitis, ulcerative    Unspecified asthma, uncomplicated 08/25/2016    Asthma    Vitreous degeneration, 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    Bilateral vitreous detachment    Vitreous degeneration, 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    Bilateral vitreous detachment    Vitreous degeneration, 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    Bilateral vitreous detachment       Past Surgical History:   Procedure Laterality Date    HYSTERECTOMY  02/24/2016    Hysterectomy    THYROID SURGERY  04/05/2016    Thyroid Surgery    TUBAL LIGATION  01/13/2016    Tubal Ligation       Social History     Socioeconomic History    Marital status:      Spouse name: Not on file    Number of children: Not on file    Years of education: Not on file    Highest education level: Not on file   Occupational History    Not on file   Tobacco Use    Smoking status: Not on file     Passive exposure: Never    Smokeless tobacco: Not on file   Substance and Sexual Activity    Alcohol use: Not on file    Drug use: Not on file    Sexual activity: Not on file   Other Topics Concern    Not on file   Social History Narrative    Not on file     Social Determinants of Health     Financial Resource Strain: Not on file   Food Insecurity: Not on file   Transportation Needs: Not on file   Physical Activity: Not on file   Stress: Not on file   Social Connections: Not on file   Intimate Partner Violence: Not on file   Housing Stability: Not on file  "      Physical Exam   height is 1.575 m (5' 2\") and weight is 80.3 kg (177 lb). Her blood pressure is 144/73 and her pulse is 78.   General: not in acute distress  HEENT: JAYESH HERNANDEZ  Thyroid: no goiter  Neuro: alert and oriented x 3    Current Outpatient Medications   Medication Sig Dispense Refill    acetaminophen (Tylenol Extra Strength) 500 mg tablet TAKE 1 TABLET EVERY 4 TO 6 HOURS AS NEEDED.      albuterol (ProAir HFA) 90 mcg/actuation inhaler Inhale 2 puffs every 4 hours if needed for shortness of breath. 18 g 11    amLODIPine (Norvasc) 10 mg tablet Take 1 tablet by mouth once daily 90 tablet 1    aspirin 81 mg EC tablet Take 1 tablet (81 mg) by mouth once daily.      aspirin-calcium carbonate 81 mg-300 mg calcium(777 mg) tablet Take 1 tablet by mouth once daily.      blood sugar diagnostic strip Use to check blood sugar 3 times per day 300 each 0    cyclobenzaprine (Flexeril) 10 mg tablet Take 1 tablet (10 mg) by mouth once daily at bedtime.      fluticasone (Flonase) 50 mcg/actuation nasal spray Administer 2 sprays into each nostril once daily.      glipiZIDE (Glucotrol) 10 mg tablet Take 1 tablet (10 mg) by mouth 2 times a day before meals.      insulin glargine (Lantus U-100 Insulin) 100 unit/mL injection Inject 10 Units under the skin once daily. Take as directed per insulin instructions.      lancets (OneTouch Delica Lancets) 33 gauge misc 1 each 3 times a day.      latanoprost (Xalatan) 0.005 % ophthalmic solution Administer 1 drop into both eyes once daily at bedtime. 4.5 mL 3    lidocaine (Lidoderm) 5 % patch Use as directed on package      lisinopriL-hydrochlorothiazide 20-25 mg tablet Take 1 tablet by mouth once daily. 90 tablet 1    metFORMIN (Glucophage) 1,000 mg tablet Take 1 tablet (1,000 mg) by mouth 2 times a day with meals. 180 tablet 0    metoprolol succinate XL (Toprol-XL) 50 mg 24 hr tablet Take 1 tablet (50 mg) by mouth once daily. Do not crush or chew. 90 tablet 0    pen needle, diabetic " "(BD Ultra-Fine Mini Pen Needle) 31 gauge x 3/16\" needle 1 each once daily. With insulin as directed      simvastatin (Zocor) 20 mg tablet Take 1 tablet (20 mg) by mouth once daily. 90 tablet 1    Trulicity 4.5 mg/0.5 mL pen injector INJECT 4.5MG (0.5ML) UNDER THE SKIN ONCE A WEEK 2 mL 0    pantoprazole (ProtoNix) 40 mg EC tablet Take 1 tablet (40 mg) by mouth once daily. Do not crush, chew, or split. 90 tablet 1     No current facility-administered medications for this visit.       Assessment and Plan  78 y.o. female with hx of T2DM, HTN, HLD, here for follow-up of T2DM.     1. Type 2 diabetes mellitus, uncontrolled  2. Hypertension  3. Hyperlipidemia  HbA1c: 6.7% (12/20/2023), 8.1% (5/18/2023), 8.2% (1/16/23), 6.9% (8/24/2022), 7.7% (5/18/2022), 8.2% (02/11/2022), 8.5% (9/15/2021), 7.8% (02/2021)  Current regimen: glipizide 10 mg BID, metformin 1g BID, Lantus 15 units QHS, Trulicity 4.5 mg/week  Eye exam: no DR (10/2023)  Urine microalbumin: 17.0 ug/mg (05/2023) on ACE-I  Lipids: HDL 46, LDL 56,  (01/2023) on simvastatin 20 mg     Today, POC A1c: 6.7%!  Reports that she usually skips her Lantus dose on Sunday nights.  She takes Trulicity every Sunday, reports that on Sunday night, her blood sugars drop to the 70s-80s at bedtime and this has caused hypoglycemia overnight when she took Lantus. AM FS on Mondays after Trulicity and without Lantus are noted to be in the low 100s.  Typically, I do not recommending skipping Lantus. However, in her case, there seems to be a clear pattern, so I am ok with her skipping Lantus only on the night she takes Trulicity.  Generally, her bedtime BG (when she checks) ranges from mid 100s to low 200s.  If she is able to stop snacking after dinner, this would improve her bedtime glucose and she will not require as much Lantus.    PLAN:  -continue Lantus 15 units QAM (skip on day of Trulicity injection)  -continue Trulicity 4.5 mg/week  -continue glipizide, metformin  -continue to " check blood sugars twice a day (fasting and bedtime)  -reapply for [employer] PAP (provided patient forms, she will complete and bring back to office)  -check HbA1c, lipids, urine microalbumin before next visit    Follow-up in 3 months (labs prior)    I saw and evaluated the patient. I personally obtained the key and critical portions of the history and physical exam or was physically present for key and critical portions performed by the resident/fellow. I reviewed the resident/fellow's documentation and discussed the patient with the resident/fellow. I agree with the resident/fellow's medical decision making as documented in the note.    Kristina Nunez MD

## 2023-12-20 ENCOUNTER — OFFICE VISIT (OUTPATIENT)
Dept: ENDOCRINOLOGY | Facility: CLINIC | Age: 78
End: 2023-12-20
Payer: MEDICARE

## 2023-12-20 VITALS
SYSTOLIC BLOOD PRESSURE: 144 MMHG | DIASTOLIC BLOOD PRESSURE: 73 MMHG | HEART RATE: 78 BPM | WEIGHT: 177 LBS | BODY MASS INDEX: 32.57 KG/M2 | HEIGHT: 62 IN

## 2023-12-20 DIAGNOSIS — Z79.4 TYPE 2 DIABETES MELLITUS WITHOUT COMPLICATION, WITH LONG-TERM CURRENT USE OF INSULIN (MULTI): Primary | ICD-10-CM

## 2023-12-20 DIAGNOSIS — E11.9 TYPE 2 DIABETES MELLITUS WITHOUT COMPLICATION, WITH LONG-TERM CURRENT USE OF INSULIN (MULTI): Primary | ICD-10-CM

## 2023-12-20 LAB — POC HEMOGLOBIN A1C: 6.7 % (ref 4.2–6.5)

## 2023-12-20 PROCEDURE — 99215 OFFICE O/P EST HI 40 MIN: CPT | Performed by: STUDENT IN AN ORGANIZED HEALTH CARE EDUCATION/TRAINING PROGRAM

## 2023-12-20 PROCEDURE — 1036F TOBACCO NON-USER: CPT | Performed by: STUDENT IN AN ORGANIZED HEALTH CARE EDUCATION/TRAINING PROGRAM

## 2023-12-20 PROCEDURE — 83036 HEMOGLOBIN GLYCOSYLATED A1C: CPT | Performed by: STUDENT IN AN ORGANIZED HEALTH CARE EDUCATION/TRAINING PROGRAM

## 2023-12-20 PROCEDURE — 3078F DIAST BP <80 MM HG: CPT | Performed by: STUDENT IN AN ORGANIZED HEALTH CARE EDUCATION/TRAINING PROGRAM

## 2023-12-20 PROCEDURE — 1126F AMNT PAIN NOTED NONE PRSNT: CPT | Performed by: STUDENT IN AN ORGANIZED HEALTH CARE EDUCATION/TRAINING PROGRAM

## 2023-12-20 PROCEDURE — 3077F SYST BP >= 140 MM HG: CPT | Performed by: STUDENT IN AN ORGANIZED HEALTH CARE EDUCATION/TRAINING PROGRAM

## 2023-12-20 PROCEDURE — 1159F MED LIST DOCD IN RCRD: CPT | Performed by: STUDENT IN AN ORGANIZED HEALTH CARE EDUCATION/TRAINING PROGRAM

## 2023-12-20 ASSESSMENT — PAIN SCALES - GENERAL: PAINLEVEL: 0-NO PAIN

## 2023-12-27 ENCOUNTER — DOCUMENTATION (OUTPATIENT)
Dept: ENDOCRINOLOGY | Facility: CLINIC | Age: 78
End: 2023-12-27
Payer: MEDICARE

## 2023-12-27 DIAGNOSIS — E11.65 TYPE 2 DIABETES MELLITUS WITH HYPERGLYCEMIA, UNSPECIFIED WHETHER LONG TERM INSULIN USE (MULTI): ICD-10-CM

## 2023-12-27 RX ORDER — DULAGLUTIDE 4.5 MG/.5ML
4.5 INJECTION, SOLUTION SUBCUTANEOUS
Qty: 6 ML | Refills: 3 | Status: SHIPPED | OUTPATIENT
Start: 2023-12-27

## 2023-12-27 NOTE — PROGRESS NOTES
Received [employer] PAP for Trulicity from patient.  Form completed and signed by me.  Faxed today.

## 2024-01-05 ENCOUNTER — LAB (OUTPATIENT)
Dept: LAB | Facility: LAB | Age: 79
End: 2024-01-05
Payer: MEDICARE

## 2024-01-08 DIAGNOSIS — I10 PRIMARY HYPERTENSION: ICD-10-CM

## 2024-01-08 RX ORDER — METOPROLOL SUCCINATE 50 MG/1
50 TABLET, EXTENDED RELEASE ORAL DAILY
Qty: 30 TABLET | Refills: 0 | Status: SHIPPED | OUTPATIENT
Start: 2024-01-08 | End: 2024-01-30 | Stop reason: SDUPTHER

## 2024-01-10 DIAGNOSIS — E11.65 TYPE 2 DIABETES MELLITUS WITH HYPERGLYCEMIA, UNSPECIFIED WHETHER LONG TERM INSULIN USE (MULTI): ICD-10-CM

## 2024-01-10 RX ORDER — METFORMIN HYDROCHLORIDE 1000 MG/1
1000 TABLET ORAL
Qty: 180 TABLET | Refills: 0 | Status: SHIPPED | OUTPATIENT
Start: 2024-01-10 | End: 2024-01-23 | Stop reason: SDUPTHER

## 2024-01-17 PROBLEM — Z85.42 HISTORY OF MALIGNANT NEOPLASM OF UTERINE BODY: Status: ACTIVE | Noted: 2024-01-17

## 2024-01-17 PROBLEM — L60.0 INGROWN NAIL OF GREAT TOE: Status: ACTIVE | Noted: 2024-01-17

## 2024-01-17 PROBLEM — L60.3 DYSTROPHIA UNGUIUM: Status: ACTIVE | Noted: 2024-01-17

## 2024-01-17 PROBLEM — F43.21 GRIEF: Status: ACTIVE | Noted: 2024-01-17

## 2024-01-17 PROBLEM — Z86.39 HISTORY OF DIABETES MELLITUS: Status: ACTIVE | Noted: 2024-01-17

## 2024-01-17 PROBLEM — M62.838 MUSCLE SPASMS OF NECK: Status: ACTIVE | Noted: 2024-01-17

## 2024-01-17 PROBLEM — H01.019 ULCERATIVE BLEPHARITIS: Status: ACTIVE | Noted: 2019-09-13

## 2024-01-17 PROBLEM — L98.9 SKIN LESION: Status: ACTIVE | Noted: 2024-01-17

## 2024-01-17 PROBLEM — R35.0 INCREASED FREQUENCY OF URINATION: Status: ACTIVE | Noted: 2024-01-17

## 2024-01-17 PROBLEM — J01.90 ACUTE SINUSITIS: Status: ACTIVE | Noted: 2024-01-17

## 2024-01-17 PROBLEM — H25.819 COMBINED FORMS OF AGE-RELATED CATARACT: Status: ACTIVE | Noted: 2023-02-06
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
## 2024-01-17 PROBLEM — N39.0 URINARY TRACT INFECTION: Status: ACTIVE | Noted: 2024-01-17

## 2024-01-17 PROBLEM — H66.40 SUPPURATIVE OTITIS MEDIA: Status: ACTIVE | Noted: 2024-01-17

## 2024-01-17 PROBLEM — S60.419A: Status: ACTIVE | Noted: 2023-09-10

## 2024-01-18 ENCOUNTER — LAB (OUTPATIENT)
Dept: LAB | Facility: LAB | Age: 79
End: 2024-01-18
Payer: MEDICARE

## 2024-01-18 ENCOUNTER — OFFICE VISIT (OUTPATIENT)
Dept: PRIMARY CARE | Facility: CLINIC | Age: 79
End: 2024-01-18
Payer: MEDICARE

## 2024-01-18 VITALS
RESPIRATION RATE: 18 BRPM | SYSTOLIC BLOOD PRESSURE: 120 MMHG | OXYGEN SATURATION: 98 % | HEIGHT: 62 IN | WEIGHT: 174.6 LBS | HEART RATE: 100 BPM | DIASTOLIC BLOOD PRESSURE: 62 MMHG | BODY MASS INDEX: 32.13 KG/M2

## 2024-01-18 DIAGNOSIS — R19.7 DIARRHEA, UNSPECIFIED TYPE: ICD-10-CM

## 2024-01-18 DIAGNOSIS — Z00.00 HEALTHCARE MAINTENANCE: ICD-10-CM

## 2024-01-18 DIAGNOSIS — E11.00 TYPE 2 DIABETES MELLITUS WITH HYPEROSMOLARITY WITHOUT COMA, UNSPECIFIED WHETHER LONG TERM INSULIN USE (MULTI): ICD-10-CM

## 2024-01-18 DIAGNOSIS — I10 PRIMARY HYPERTENSION: ICD-10-CM

## 2024-01-18 DIAGNOSIS — Z00.00 MEDICARE ANNUAL WELLNESS VISIT, SUBSEQUENT: Primary | ICD-10-CM

## 2024-01-18 DIAGNOSIS — E78.5 HYPERLIPIDEMIA LDL GOAL <100: ICD-10-CM

## 2024-01-18 PROCEDURE — 82570 ASSAY OF URINE CREATININE: CPT

## 2024-01-18 PROCEDURE — 3078F DIAST BP <80 MM HG: CPT | Performed by: INTERNAL MEDICINE

## 2024-01-18 PROCEDURE — 1126F AMNT PAIN NOTED NONE PRSNT: CPT | Performed by: INTERNAL MEDICINE

## 2024-01-18 PROCEDURE — 83718 ASSAY OF LIPOPROTEIN: CPT

## 2024-01-18 PROCEDURE — 83036 HEMOGLOBIN GLYCOSYLATED A1C: CPT

## 2024-01-18 PROCEDURE — 82465 ASSAY BLD/SERUM CHOLESTEROL: CPT

## 2024-01-18 PROCEDURE — G0439 PPPS, SUBSEQ VISIT: HCPCS | Performed by: INTERNAL MEDICINE

## 2024-01-18 PROCEDURE — 85025 COMPLETE CBC W/AUTO DIFF WBC: CPT

## 2024-01-18 PROCEDURE — 80053 COMPREHEN METABOLIC PANEL: CPT

## 2024-01-18 PROCEDURE — 1036F TOBACCO NON-USER: CPT | Performed by: INTERNAL MEDICINE

## 2024-01-18 PROCEDURE — 1159F MED LIST DOCD IN RCRD: CPT | Performed by: INTERNAL MEDICINE

## 2024-01-18 PROCEDURE — 83721 ASSAY OF BLOOD LIPOPROTEIN: CPT

## 2024-01-18 PROCEDURE — 3074F SYST BP LT 130 MM HG: CPT | Performed by: INTERNAL MEDICINE

## 2024-01-18 PROCEDURE — 84443 ASSAY THYROID STIM HORMONE: CPT

## 2024-01-18 PROCEDURE — 1170F FXNL STATUS ASSESSED: CPT | Performed by: INTERNAL MEDICINE

## 2024-01-18 PROCEDURE — 36415 COLL VENOUS BLD VENIPUNCTURE: CPT

## 2024-01-18 PROCEDURE — 82043 UR ALBUMIN QUANTITATIVE: CPT

## 2024-01-18 PROCEDURE — 99397 PER PM REEVAL EST PAT 65+ YR: CPT | Performed by: INTERNAL MEDICINE

## 2024-01-18 ASSESSMENT — PATIENT HEALTH QUESTIONNAIRE - PHQ9
SUM OF ALL RESPONSES TO PHQ9 QUESTIONS 1 AND 2: 0
2. FEELING DOWN, DEPRESSED OR HOPELESS: NOT AT ALL
2. FEELING DOWN, DEPRESSED OR HOPELESS: NOT AT ALL
SUM OF ALL RESPONSES TO PHQ9 QUESTIONS 1 AND 2: 0
1. LITTLE INTEREST OR PLEASURE IN DOING THINGS: NOT AT ALL
1. LITTLE INTEREST OR PLEASURE IN DOING THINGS: NOT AT ALL

## 2024-01-18 ASSESSMENT — ENCOUNTER SYMPTOMS
OCCASIONAL FEELINGS OF UNSTEADINESS: 0
HEADACHES: 0
COUGH: 0
SHORTNESS OF BREATH: 0
WHEEZING: 0
HEMATURIA: 0
NECK PAIN: 0
VOMITING: 0
DEPRESSION: 0
FEVER: 0
WEAKNESS: 0
MYALGIAS: 0
DYSURIA: 0
BLOOD IN STOOL: 0
RHINORRHEA: 0
APPETITE CHANGE: 0
LOSS OF SENSATION IN FEET: 0
NECK STIFFNESS: 0
PALPITATIONS: 0
ABDOMINAL PAIN: 0
SORE THROAT: 0
DIAPHORESIS: 0
CHILLS: 0
CONSTIPATION: 0
NUMBNESS: 0
SINUS PRESSURE: 0
DIARRHEA: 1
DIFFICULTY URINATING: 0
ABDOMINAL DISTENTION: 0
FREQUENCY: 0
FATIGUE: 0
ARTHRALGIAS: 0
DIZZINESS: 0
NAUSEA: 0
BACK PAIN: 0
LIGHT-HEADEDNESS: 0
JOINT SWELLING: 0

## 2024-01-18 ASSESSMENT — ACTIVITIES OF DAILY LIVING (ADL)
MANAGING_FINANCES: INDEPENDENT
GROCERY_SHOPPING: INDEPENDENT
BATHING: INDEPENDENT
DRESSING: INDEPENDENT
TAKING_MEDICATION: INDEPENDENT
DOING_HOUSEWORK: INDEPENDENT

## 2024-01-18 NOTE — ASSESSMENT & PLAN NOTE
She has been having loose (not watery bowel movements for approx 1 month). She reported having a BM every time she ate. She has no abdominal pain, N/V/C.  She feels stool is becoming more formed and is having less episodes.   She does not eat dairy   Will monitor, if no improvement in next few weeks will investigate further.

## 2024-01-18 NOTE — ASSESSMENT & PLAN NOTE
- HbA1C above target goal 7%  - Monitor HbA1C, renal function, urine protein  - Continue trulicity 4.5 mg weekly, metformin 1000 mg BID, Insulin lantus 15 U at bedtime   -Ophthalmology and podiatry check-ups recommended  -Educate about adverse effects of uncontrolled DMT2 and its complications  -Healthy low carbohydrate diet and regular exercise

## 2024-01-18 NOTE — ASSESSMENT & PLAN NOTE
-BP above target goal 130/80  -CMP, TSH ordered  -Continue amlodipine 10 mg, lisinopril-hydrochlorothiazide 20-25 mg daily   -Keep BP log  -Low sodium diet, regular exercise recommended

## 2024-01-18 NOTE — PROGRESS NOTES
"Subjective   Patient ID: Nii Colunga is a 78 y.o. female who presents for Medicare Annual Wellness Visit Subsequent.    HPI   She complains of diarrhea.     Review of Systems   Constitutional:  Negative for appetite change, chills, diaphoresis, fatigue and fever.   HENT:  Negative for congestion, ear discharge, ear pain, hearing loss, postnasal drip, rhinorrhea, sinus pressure, sore throat and tinnitus.    Eyes:  Negative for visual disturbance.   Respiratory:  Negative for cough, shortness of breath and wheezing.    Cardiovascular:  Negative for chest pain, palpitations and leg swelling.   Gastrointestinal:  Positive for diarrhea. Negative for abdominal distention, abdominal pain, blood in stool, constipation, nausea and vomiting.   Genitourinary:  Negative for decreased urine volume, difficulty urinating, dysuria, frequency, hematuria and urgency.   Musculoskeletal:  Negative for arthralgias, back pain, gait problem, joint swelling, myalgias, neck pain and neck stiffness.   Skin:  Negative for rash.   Neurological:  Negative for dizziness, weakness, light-headedness, numbness and headaches.       Objective   /62   Pulse 100   Resp 18   Ht 1.575 m (5' 2\")   Wt 79.2 kg (174 lb 9.6 oz)   SpO2 98%   BMI 31.93 kg/m²     Physical Exam  Vitals reviewed.   Constitutional:       Appearance: Normal appearance. She is normal weight.   HENT:      Right Ear: Tympanic membrane and external ear normal.      Left Ear: Tympanic membrane and external ear normal.   Eyes:      Extraocular Movements: Extraocular movements intact.      Conjunctiva/sclera: Conjunctivae normal.      Pupils: Pupils are equal, round, and reactive to light.   Cardiovascular:      Rate and Rhythm: Normal rate and regular rhythm.      Pulses: Normal pulses.   Pulmonary:      Effort: Pulmonary effort is normal.      Breath sounds: Normal breath sounds.   Abdominal:      General: Bowel sounds are normal.      Palpations: Abdomen is soft. "   Musculoskeletal:         General: Normal range of motion.      Cervical back: Normal range of motion.   Skin:     General: Skin is warm and dry.   Neurological:      General: No focal deficit present.      Mental Status: She is oriented to person, place, and time.   Psychiatric:         Mood and Affect: Mood normal.         Behavior: Behavior normal.         Assessment/Plan   Problem List Items Addressed This Visit             ICD-10-CM    Hyperlipidemia LDL goal <100 E78.5     Repeat lipid panel  Contniue simvastatin 20 mg daily          Relevant Orders    Cholesterol, LDL Direct    Lipid Panel Non-Fasting    Hypertension I10     -BP above target goal 130/80  -CMP, TSH ordered  -Continue amlodipine 10 mg, lisinopril-hydrochlorothiazide 20-25 mg daily   -Keep BP log  -Low sodium diet, regular exercise recommended           Relevant Orders    CBC and Auto Differential    Comprehensive Metabolic Panel    TSH with reflex to Free T4 if abnormal    Type 2 diabetes mellitus (CMS/[facility]) E11.9     - HbA1C above target goal 7%  - Monitor HbA1C, renal function, urine protein  - Continue trulicity 4.5 mg weekly, metformin 1000 mg BID, Insulin lantus 15 U at bedtime   -Ophthalmology and podiatry check-ups recommended  -Educate about adverse effects of uncontrolled DMT2 and its complications  -Healthy low carbohydrate diet and regular exercise           Relevant Orders    CBC and Auto Differential    Comprehensive Metabolic Panel    Hemoglobin A1C    Albumin , Urine Random    Medicare annual wellness visit, subsequent - Primary Z00.00    Healthcare maintenance Z00.00    Relevant Orders    CBC and Auto Differential    Cholesterol, LDL Direct    Comprehensive Metabolic Panel    Hemoglobin A1C    Lipid Panel Non-Fasting    TSH with reflex to Free T4 if abnormal    Diarrhea R19.7     She has been having loose (not watery bowel movements for approx 1 month). She reported having a BM every time she ate. She has no abdominal pain,  N/V/C.  She feels stool is becoming more formed and is having less episodes.   She does not eat dairy   Will monitor, if no improvement in next few weeks will investigate further.         Advance Care Planning      Advance Care Planning was discussed with patient, and the patient's Advance Care Plan is as follows: surrogate: spouse            RTC in 6 mo

## 2024-01-19 LAB
ALBUMIN SERPL BCP-MCNC: 4.6 G/DL (ref 3.4–5)
ALP SERPL-CCNC: 58 U/L (ref 33–136)
ALT SERPL W P-5'-P-CCNC: 15 U/L (ref 7–45)
ANION GAP SERPL CALC-SCNC: 16 MMOL/L (ref 10–20)
AST SERPL W P-5'-P-CCNC: 14 U/L (ref 9–39)
BASOPHILS # BLD AUTO: 0.08 X10*3/UL (ref 0–0.1)
BASOPHILS NFR BLD AUTO: 0.9 %
BILIRUB SERPL-MCNC: 0.3 MG/DL (ref 0–1.2)
BUN SERPL-MCNC: 23 MG/DL (ref 6–23)
CALCIUM SERPL-MCNC: 10.4 MG/DL (ref 8.6–10.6)
CHLORIDE SERPL-SCNC: 101 MMOL/L (ref 98–107)
CHOLEST SERPL-MCNC: 109 MG/DL (ref 0–199)
CHOLESTEROL/HDL RATIO: 2.3
CO2 SERPL-SCNC: 27 MMOL/L (ref 21–32)
CREAT SERPL-MCNC: 1.11 MG/DL (ref 0.5–1.05)
CREAT UR-MCNC: 177.8 MG/DL (ref 20–320)
EGFRCR SERPLBLD CKD-EPI 2021: 51 ML/MIN/1.73M*2
EOSINOPHIL # BLD AUTO: 0.1 X10*3/UL (ref 0–0.4)
EOSINOPHIL NFR BLD AUTO: 1.1 %
ERYTHROCYTE [DISTWIDTH] IN BLOOD BY AUTOMATED COUNT: 14.8 % (ref 11.5–14.5)
EST. AVERAGE GLUCOSE BLD GHB EST-MCNC: 140 MG/DL
GLUCOSE SERPL-MCNC: 164 MG/DL (ref 74–99)
HBA1C MFR BLD: 6.5 %
HCT VFR BLD AUTO: 43.7 % (ref 36–46)
HDLC SERPL-MCNC: 47.8 MG/DL
HGB BLD-MCNC: 13.8 G/DL (ref 12–16)
IMM GRANULOCYTES # BLD AUTO: 0.05 X10*3/UL (ref 0–0.5)
IMM GRANULOCYTES NFR BLD AUTO: 0.6 % (ref 0–0.9)
LDLC SERPL DIRECT ASSAY-MCNC: 50 MG/DL (ref 0–129)
LYMPHOCYTES # BLD AUTO: 2.46 X10*3/UL (ref 0.8–3)
LYMPHOCYTES NFR BLD AUTO: 28.3 %
MCH RBC QN AUTO: 28 PG (ref 26–34)
MCHC RBC AUTO-ENTMCNC: 31.6 G/DL (ref 32–36)
MCV RBC AUTO: 89 FL (ref 80–100)
MICROALBUMIN UR-MCNC: 20.5 MG/L
MICROALBUMIN/CREAT UR: 11.5 UG/MG CREAT
MONOCYTES # BLD AUTO: 0.4 X10*3/UL (ref 0.05–0.8)
MONOCYTES NFR BLD AUTO: 4.6 %
NEUTROPHILS # BLD AUTO: 5.61 X10*3/UL (ref 1.6–5.5)
NEUTROPHILS NFR BLD AUTO: 64.5 %
NON-HDL CHOLESTEROL: 61 MG/DL (ref 0–149)
NRBC BLD-RTO: 0 /100 WBCS (ref 0–0)
PLATELET # BLD AUTO: 362 X10*3/UL (ref 150–450)
POTASSIUM SERPL-SCNC: 4.6 MMOL/L (ref 3.5–5.3)
PROT SERPL-MCNC: 7.6 G/DL (ref 6.4–8.2)
RBC # BLD AUTO: 4.93 X10*6/UL (ref 4–5.2)
SODIUM SERPL-SCNC: 139 MMOL/L (ref 136–145)
TSH SERPL-ACNC: 1.48 MIU/L (ref 0.44–3.98)
WBC # BLD AUTO: 8.7 X10*3/UL (ref 4.4–11.3)

## 2024-01-23 ENCOUNTER — TELEPHONE (OUTPATIENT)
Dept: ENDOCRINOLOGY | Facility: CLINIC | Age: 79
End: 2024-01-23
Payer: MEDICARE

## 2024-01-23 DIAGNOSIS — E11.65 TYPE 2 DIABETES MELLITUS WITH HYPERGLYCEMIA, UNSPECIFIED WHETHER LONG TERM INSULIN USE (MULTI): ICD-10-CM

## 2024-01-23 RX ORDER — METFORMIN HYDROCHLORIDE 1000 MG/1
1000 TABLET ORAL
Qty: 180 TABLET | Refills: 1 | Status: SHIPPED | OUTPATIENT
Start: 2024-01-23

## 2024-01-23 NOTE — RESULT ENCOUNTER NOTE
Blood sugars are well-controlled.  Continue current medications.    CKD stage 3, stable: I recommend eating a healthy low sodium diet, avoid medications which can harm the kidneys such as NSAIDs (ibuprofen, Aleve, Motrin, diclofenac, Advil) and drinking adequate quantities of water to remain hydrated. It is also important to keep blood pressures and blood sugars well controlled to avoid further decline in kidney function. We will continue to monitor.    Blood tests were otherwise normal.

## 2024-01-23 NOTE — TELEPHONE ENCOUNTER
Left a VM on refill line that [facility] has no record of the refill sent in on 1/10 for her Metformin. She is asking that we please resend.

## 2024-01-30 DIAGNOSIS — I10 HYPERTENSION, UNSPECIFIED TYPE: ICD-10-CM

## 2024-01-30 DIAGNOSIS — I10 PRIMARY HYPERTENSION: ICD-10-CM

## 2024-01-30 DIAGNOSIS — E78.5 HYPERLIPIDEMIA LDL GOAL <100: ICD-10-CM

## 2024-01-30 RX ORDER — LISINOPRIL AND HYDROCHLOROTHIAZIDE 20; 25 MG/1; MG/1
1 TABLET ORAL DAILY
Qty: 90 TABLET | Refills: 1 | Status: SHIPPED | OUTPATIENT
Start: 2024-01-30

## 2024-01-30 RX ORDER — METOPROLOL SUCCINATE 50 MG/1
50 TABLET, EXTENDED RELEASE ORAL DAILY
Qty: 90 TABLET | Refills: 1 | Status: SHIPPED | OUTPATIENT
Start: 2024-01-30

## 2024-01-30 RX ORDER — SIMVASTATIN 20 MG/1
20 TABLET, FILM COATED ORAL DAILY
Qty: 90 TABLET | Refills: 1 | Status: SHIPPED | OUTPATIENT
Start: 2024-01-30

## 2024-02-08 DIAGNOSIS — K21.9 GASTROESOPHAGEAL REFLUX DISEASE, UNSPECIFIED WHETHER ESOPHAGITIS PRESENT: ICD-10-CM

## 2024-02-08 RX ORDER — PANTOPRAZOLE SODIUM 40 MG/1
40 TABLET, DELAYED RELEASE ORAL DAILY
Qty: 90 TABLET | Refills: 1 | Status: SHIPPED | OUTPATIENT
Start: 2024-02-08

## 2024-03-06 DIAGNOSIS — I10 PRIMARY HYPERTENSION: ICD-10-CM

## 2024-03-11 RX ORDER — AMLODIPINE BESYLATE 10 MG/1
10 TABLET ORAL DAILY
Qty: 90 TABLET | Refills: 1 | Status: SHIPPED | OUTPATIENT
Start: 2024-03-11

## 2024-03-18 DIAGNOSIS — H40.002 GLAUCOMA SUSPECT OF LEFT EYE: ICD-10-CM

## 2024-03-18 DIAGNOSIS — H40.001 GLAUCOMA SUSPECT OF RIGHT EYE: ICD-10-CM

## 2024-03-18 DIAGNOSIS — H40.053 BILATERAL OCULAR HYPERTENSION: ICD-10-CM

## 2024-03-18 RX ORDER — LATANOPROST 50 UG/ML
SOLUTION/ DROPS OPHTHALMIC
Qty: 9 ML | Refills: 0 | Status: SHIPPED | OUTPATIENT
Start: 2024-03-18 | End: 2024-04-11 | Stop reason: SDUPTHER

## 2024-03-22 NOTE — PROGRESS NOTES
FUV for diabetes. LV with me 2024.    History of Present Illness  78 y.o. female with H/O type 2 diabetes, HTN, HLD.     Dx:   HbA1c: 6.5% (2024), 6.7% (2023), 8.1% (2023), 8.2% (2023), 6.9% (2022), 7.7% (2022), 8.2% (2022), 8.5% (2021), 7.8% (2021)  Current regimen: glipizide 10 mg BID, metformin 1g BID, Lantus 15 units QHS, Trulicity 4.5 mg/week  Past medications: Januvia  Complications:none  Comorbidities: HTN, HLD     SMBG: twice day  Fasting: low 100s-120s  Bedtime: mid 100s     Hypoglycemia: no    SMB-2 times per day    Diet: low-carb , Usually 2 meals / day (breakfast and dinner)   Sometimes she skips a meal and drinks Glucerna   Snacks all day: nuts, chips, ice cream bar  Loves potatoes  Takes coffee with cream usually once a day, diet soda  Drinks juice very occasionally.      Exercise: 1 hour on the bike every other day    TODAY:  -has been having diarrhea a few times per week since end of last year  -also with nausea/vomiting, once or twice a week which started around the same time as the diarrhea    ROS  General: no fever or chills  CV: no chest pain   Respiratory: no shortness of breath  MSK: no lower extremity edema  Neuro: no headache or dizziness  See HPI for Endocrine ROS    Past Medical History:   Diagnosis Date    Disorder of thyroid, unspecified 2016    Thyroid mass    Dry eye syndrome of bilateral lacrimal glands 2019    Bilateral dry eyes    Dry eye syndrome of bilateral lacrimal glands 2019    Bilateral dry eyes    Dry eye syndrome of bilateral lacrimal glands 2019    Bilateral dry eyes    Essential (primary) hypertension 2022    Hypertension    Personal history of malignant neoplasm of other parts of uterus 2016    History of cancer of uterus    Personal history of other endocrine, nutritional and metabolic disease     History of diabetes mellitus    Preglaucoma, unspecified, bilateral 2019     Glaucoma suspect of both eyes    Preglaucoma, unspecified, 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    Glaucoma suspect of both eyes    Preglaucoma, unspecified, 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    Glaucoma suspect of both eyes    Ulcerative blepharitis unspecified eye, 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/13/2019    Blepharitis, ulcerative    Unspecified asthma, uncomplicated 08/25/2016    Asthma    Vitreous degeneration, 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    Bilateral vitreous detachment    Vitreous degeneration, 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    Bilateral vitreous detachment    Vitreous degeneration, 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    Bilateral vitreous detachment       Past Surgical History:   Procedure Laterality Date    HYSTERECTOMY  02/24/2016    Hysterectomy    THYROID SURGERY  04/05/2016    Thyroid Surgery    TUBAL LIGATION  01/13/2016    Tubal Ligation       Social History     Socioeconomic History    Marital status:      Spouse name: Not on file    Number of children: Not on file    Years of education: Not on file    Highest education level: Not on file   Occupational History    Not on file   Tobacco Use    Smoking status: Never     Passive exposure: Never    Smokeless tobacco: Never   Substance and Sexual Activity    Alcohol use: Never    Drug use: Never    Sexual activity: Not on file   Other Topics Concern    Not on file   Social History Narrative    Not on file     Social Determinants of Health     Financial Resource Strain: Not on file   Food Insecurity: Not on file   Transportation Needs: Not on file   Physical Activity: Not on file   Stress: Not on file   Social Connections: Not on file   Intimate Partner Violence: Not on file   Housing Stability: Not on file       Physical Exam   blood pressure is 139/85 and her pulse is 80.   General: not in acute distress  HEENT: MARY, EOMI  Thyroid: no goiter  Neuro: alert and oriented x 3    Current Outpatient Medications   Medication Sig Dispense Refill    acetaminophen (Tylenol Extra Strength) 500 mg tablet TAKE 1 TABLET EVERY 4 TO  "6 HOURS AS NEEDED.      albuterol (ProAir HFA) 90 mcg/actuation inhaler Inhale 2 puffs every 4 hours if needed for shortness of breath. 18 g 11    amLODIPine (Norvasc) 10 mg tablet Take 1 tablet (10 mg) by mouth once daily. 90 tablet 1    aspirin 81 mg EC tablet Take 1 tablet (81 mg) by mouth once daily.      aspirin-calcium carbonate 81 mg-300 mg calcium(777 mg) tablet Take 1 tablet by mouth once daily.      blood sugar diagnostic strip Use to check blood sugar 3 times per day 300 each 0    cyclobenzaprine (Flexeril) 10 mg tablet Take 1 tablet (10 mg) by mouth once daily at bedtime.      dulaglutide (Trulicity) 4.5 mg/0.5 mL pen injector Inject 4.5 mg under the skin 1 (one) time per week. 6 mL 3    fluticasone (Flonase) 50 mcg/actuation nasal spray Administer 2 sprays into each nostril once daily.      glipiZIDE (Glucotrol) 10 mg tablet Take 1 tablet (10 mg) by mouth 2 times a day before meals.      insulin glargine (Lantus U-100 Insulin) 100 unit/mL injection Inject 10 Units under the skin once daily. Take as directed per insulin instructions.      lancets (OneTouch Delica Lancets) 33 gauge misc 1 each 3 times a day.      latanoprost (Xalatan) 0.005 % ophthalmic solution INSTILL 1 DROP INTO EACH EYE AT BEDTIME 9 mL 0    lidocaine (Lidoderm) 5 % patch Use as directed on package      lisinopriL-hydrochlorothiazide 20-25 mg tablet Take 1 tablet by mouth once daily. 90 tablet 1    metFORMIN (Glucophage) 1,000 mg tablet Take 1 tablet (1,000 mg) by mouth 2 times a day with meals. 180 tablet 1    metoprolol succinate XL (Toprol-XL) 50 mg 24 hr tablet Take 1 tablet (50 mg) by mouth once daily. Do not crush or chew. 90 tablet 1    pantoprazole (ProtoNix) 40 mg EC tablet Take 1 tablet (40 mg) by mouth once daily. Do not crush, chew, or split. 90 tablet 1    pen needle, diabetic (BD Ultra-Fine Mini Pen Needle) 31 gauge x 3/16\" needle 1 each once daily. With insulin as directed      simvastatin (Zocor) 20 mg tablet Take 1 " tablet (20 mg) by mouth once daily. 90 tablet 1     No current facility-administered medications for this visit.       Assessment and Plan  78 y.o. female with hx of T2DM, HTN, HLD, here for follow-up of T2DM.     1. Type 2 diabetes mellitus, uncontrolled  2. Hypertension  3. Hyperlipidemia  HbA1c: 6.5% (01/2024), 6.7% (12/20/2023), 8.1% (5/18/2023), 8.2% (1/16/23), 6.9% (8/24/2022), 7.7% (5/18/2022), 8.2% (02/11/2022), 8.5% (9/15/2021), 7.8% (02/2021)  Current regimen: glipizide 10 mg BID, metformin 1g BID, Lantus 15 units QHS, Trulicity 4.5 mg/week  Eye exam: no DR (10/2023)  Urine microalbumin: 11.5 ug/mg (1/2024) on ACE-I  Lipids: HDL 48, LDL 50, TG NA (01/2024) on simvastatin 20 mg     A1c: 6.5% in January.  Maintaining excellent glycemic control.  She has not taken Lantus on occasion especially if the bedtime BG has been in the 80s.  BG has remained in the 120s despite not taking Lantus.  Discussed discontinuing Lantus, but she would like to continue at a lower dose.    She has been experiencing diarrhea a few times per week and nausea/vomiting once a week or so since end of last year. She has been on metformin for years and Trulicity since 07/2022. She did not have any significant GI side effects after initiation of after dose increases. I do not think either of these medications are causing her diarrhea. She was started on metoprolol just prior to her symptoms starting. Advised that she reach out to her PCP regarding the possibility of switching beta blockers. She also missed her colonoscopy in September. I advised that she reschedule this.    She received a letter from [employer]ashae [employer] to reapply for the PAP.  I faxed in her application on 12/27 but it appears that she was approved through 4/4/2024, so it may have been too early. Will re-fax her application.    PLAN:  -decrease Lantus to 10 units QAM  -continue Trulicity 4.5 mg/week  -continue glipizide, metformin  -continue to check blood sugars twice a day  (fasting and bedtime)    Follow-up in 5 months

## 2024-03-27 ENCOUNTER — OFFICE VISIT (OUTPATIENT)
Dept: ENDOCRINOLOGY | Facility: CLINIC | Age: 79
End: 2024-03-27
Payer: MEDICARE

## 2024-03-27 VITALS — DIASTOLIC BLOOD PRESSURE: 85 MMHG | HEART RATE: 80 BPM | SYSTOLIC BLOOD PRESSURE: 139 MMHG

## 2024-03-27 DIAGNOSIS — E11.65 TYPE 2 DIABETES MELLITUS WITH HYPERGLYCEMIA, UNSPECIFIED WHETHER LONG TERM INSULIN USE (MULTI): Primary | ICD-10-CM

## 2024-03-27 PROCEDURE — 3075F SYST BP GE 130 - 139MM HG: CPT | Performed by: STUDENT IN AN ORGANIZED HEALTH CARE EDUCATION/TRAINING PROGRAM

## 2024-03-27 PROCEDURE — 1126F AMNT PAIN NOTED NONE PRSNT: CPT | Performed by: STUDENT IN AN ORGANIZED HEALTH CARE EDUCATION/TRAINING PROGRAM

## 2024-03-27 PROCEDURE — 1159F MED LIST DOCD IN RCRD: CPT | Performed by: STUDENT IN AN ORGANIZED HEALTH CARE EDUCATION/TRAINING PROGRAM

## 2024-03-27 PROCEDURE — 3079F DIAST BP 80-89 MM HG: CPT | Performed by: STUDENT IN AN ORGANIZED HEALTH CARE EDUCATION/TRAINING PROGRAM

## 2024-03-27 PROCEDURE — 1036F TOBACCO NON-USER: CPT | Performed by: STUDENT IN AN ORGANIZED HEALTH CARE EDUCATION/TRAINING PROGRAM

## 2024-03-27 PROCEDURE — 99215 OFFICE O/P EST HI 40 MIN: CPT | Performed by: STUDENT IN AN ORGANIZED HEALTH CARE EDUCATION/TRAINING PROGRAM

## 2024-03-27 ASSESSMENT — PAIN SCALES - GENERAL: PAINLEVEL: 0-NO PAIN

## 2024-03-28 DIAGNOSIS — Z86.010 PERSONAL HISTORY OF COLONIC POLYPS: Primary | ICD-10-CM

## 2024-04-01 DIAGNOSIS — E11.65 TYPE 2 DIABETES MELLITUS WITH HYPERGLYCEMIA, UNSPECIFIED WHETHER LONG TERM INSULIN USE (MULTI): ICD-10-CM

## 2024-04-01 RX ORDER — POLYETHYLENE GLYCOL 3350, SODIUM CHLORIDE, SODIUM BICARBONATE, POTASSIUM CHLORIDE 420; 11.2; 5.72; 1.48 G/4L; G/4L; G/4L; G/4L
POWDER, FOR SOLUTION ORAL
Qty: 4000 ML | Refills: 0 | Status: SHIPPED | OUTPATIENT
Start: 2024-04-01

## 2024-04-11 ENCOUNTER — OFFICE VISIT (OUTPATIENT)
Dept: OPHTHALMOLOGY | Facility: CLINIC | Age: 79
End: 2024-04-11
Payer: MEDICARE

## 2024-04-11 DIAGNOSIS — H40.053 BILATERAL OCULAR HYPERTENSION: Primary | ICD-10-CM

## 2024-04-11 DIAGNOSIS — H40.001 GLAUCOMA SUSPECT OF RIGHT EYE: ICD-10-CM

## 2024-04-11 DIAGNOSIS — H40.002 GLAUCOMA SUSPECT OF LEFT EYE: ICD-10-CM

## 2024-04-11 PROCEDURE — 92133 CPTRZD OPH DX IMG PST SGM ON: CPT | Performed by: OPHTHALMOLOGY

## 2024-04-11 PROCEDURE — 92083 EXTENDED VISUAL FIELD XM: CPT | Performed by: OPHTHALMOLOGY

## 2024-04-11 PROCEDURE — 99214 OFFICE O/P EST MOD 30 MIN: CPT | Performed by: OPHTHALMOLOGY

## 2024-04-11 RX ORDER — LATANOPROST 50 UG/ML
1 SOLUTION/ DROPS OPHTHALMIC NIGHTLY
Qty: 7.5 ML | Refills: 3 | Status: SHIPPED | OUTPATIENT
Start: 2024-04-11 | End: 2025-04-11

## 2024-04-11 ASSESSMENT — REFRACTION_WEARINGRX
OD_ADD: +2.50
OS_ADD: +2.50
OD_AXIS: 110
OS_AXIS: 070
OD_SPHERE: PLANO
OS_SPHERE: +0.50
OS_CYLINDER: -0.75
OD_CYLINDER: -0.50

## 2024-04-11 ASSESSMENT — ENCOUNTER SYMPTOMS
RESPIRATORY NEGATIVE: 0
ENDOCRINE NEGATIVE: 0
MUSCULOSKELETAL NEGATIVE: 0
HEMATOLOGIC/LYMPHATIC NEGATIVE: 0
CONSTITUTIONAL NEGATIVE: 0
EYES NEGATIVE: 0
GASTROINTESTINAL NEGATIVE: 0
ALLERGIC/IMMUNOLOGIC NEGATIVE: 0
CARDIOVASCULAR NEGATIVE: 0
PSYCHIATRIC NEGATIVE: 0
NEUROLOGICAL NEGATIVE: 0

## 2024-04-11 ASSESSMENT — CONF VISUAL FIELD
OS_INFERIOR_TEMPORAL_RESTRICTION: 0
OD_NORMAL: 1
OS_INFERIOR_NASAL_RESTRICTION: 0
OS_NORMAL: 1
OD_SUPERIOR_TEMPORAL_RESTRICTION: 0
OD_INFERIOR_NASAL_RESTRICTION: 0
OS_SUPERIOR_TEMPORAL_RESTRICTION: 0
OS_SUPERIOR_NASAL_RESTRICTION: 0
OD_INFERIOR_TEMPORAL_RESTRICTION: 0
OD_SUPERIOR_NASAL_RESTRICTION: 0

## 2024-04-11 ASSESSMENT — TONOMETRY
OD_IOP_MMHG: 20
OS_IOP_MMHG: 20
IOP_METHOD: GOLDMANN APPLANATION

## 2024-04-11 ASSESSMENT — VISUAL ACUITY
OS_CC: 20/50
OS_CC+: +2
OS_PH_CC: 20/30
OS_PH_CC+: +2
OD_PH_CC+: +2
METHOD: SNELLEN - LINEAR
OD_PH_CC: 20/30
CORRECTION_TYPE: GLASSES
OD_CC: 20/60

## 2024-04-11 ASSESSMENT — CUP TO DISC RATIO
OD_RATIO: 0.2
OS_RATIO: 0.3

## 2024-04-11 ASSESSMENT — EXTERNAL EXAM - LEFT EYE: OS_EXAM: NORMAL

## 2024-04-11 ASSESSMENT — SLIT LAMP EXAM - LIDS
COMMENTS: NORMAL
COMMENTS: NORMAL

## 2024-04-11 ASSESSMENT — EXTERNAL EXAM - RIGHT EYE: OD_EXAM: NORMAL

## 2024-04-11 NOTE — PROGRESS NOTES
glaucoma suspect, both eyes  ocular htn both eyes  tmax 28/29  no family hx  no trauma  gonio open  pach thick  Bower Visual Field - OU - Both Eyes          Right Eye  Threshold was 24-2. Findings include non-specific defects.     Left Eye  Threshold was 24-2. Findings include non-specific defects.     Notes  Nsd OU, no evidence of glaucoma         OCT, Optic Nerve - OU - Both Eyes          Right Eye  Images reviewed and comparison made to baseline.     Left Eye  Images reviewed and comparison made to baseline.     Notes  Pvd artifact OS  Stable thinning OU          overall no correlation between nerves, oct and field, no clear evidence of glaucoma on testing  s/pce/iol/abic OU 6/21 and 7/21 OS and OD  goal <20  IOP is great!  continue latan qhs  RTC 6 months for IOP check

## 2024-05-22 DIAGNOSIS — E11.65 TYPE 2 DIABETES MELLITUS WITH HYPERGLYCEMIA, UNSPECIFIED WHETHER LONG TERM INSULIN USE (MULTI): Primary | ICD-10-CM

## 2024-05-22 RX ORDER — INSULIN GLARGINE 100 [IU]/ML
INJECTION, SOLUTION SUBCUTANEOUS
Qty: 18 ML | Refills: 0 | Status: SHIPPED | OUTPATIENT
Start: 2024-05-22

## 2024-06-17 ENCOUNTER — TELEPHONE (OUTPATIENT)
Dept: ENDOCRINOLOGY | Facility: CLINIC | Age: 79
End: 2024-06-17

## 2024-06-17 ENCOUNTER — APPOINTMENT (OUTPATIENT)
Dept: GASTROENTEROLOGY | Facility: EXTERNAL LOCATION | Age: 79
End: 2024-06-17
Payer: MEDICARE

## 2024-06-17 DIAGNOSIS — Z80.0 FAMILY HISTORY OF COLON CANCER: ICD-10-CM

## 2024-06-17 DIAGNOSIS — K64.0 FIRST DEGREE HEMORRHOIDS: ICD-10-CM

## 2024-06-17 DIAGNOSIS — Z86.010 PERSONAL HISTORY OF COLONIC POLYPS: ICD-10-CM

## 2024-06-17 DIAGNOSIS — Z12.11 COLON CANCER SCREENING: Primary | ICD-10-CM

## 2024-06-17 PROCEDURE — 88305 TISSUE EXAM BY PATHOLOGIST: CPT | Performed by: PATHOLOGY

## 2024-06-17 PROCEDURE — 88305 TISSUE EXAM BY PATHOLOGIST: CPT

## 2024-06-17 PROCEDURE — 45380 COLONOSCOPY AND BIOPSY: CPT | Performed by: INTERNAL MEDICINE

## 2024-06-17 PROCEDURE — 0753T DGTZ GLS MCRSCP SLD LEVEL IV: CPT

## 2024-06-17 NOTE — PROGRESS NOTES
Colonoscopy performed today 6/17/2024 at the [facility] [place] [place]  [place] report(s) under Media tab.

## 2024-06-18 ENCOUNTER — LAB REQUISITION (OUTPATIENT)
Dept: LAB | Facility: HOSPITAL | Age: 79
End: 2024-06-18
Payer: MEDICARE

## 2024-06-20 DIAGNOSIS — H40.002 GLAUCOMA SUSPECT OF LEFT EYE: ICD-10-CM

## 2024-06-20 DIAGNOSIS — H40.053 BILATERAL OCULAR HYPERTENSION: ICD-10-CM

## 2024-06-20 DIAGNOSIS — H40.001 GLAUCOMA SUSPECT OF RIGHT EYE: ICD-10-CM

## 2024-06-21 RX ORDER — LATANOPROST 50 UG/ML
SOLUTION/ DROPS OPHTHALMIC
Qty: 9 ML | Refills: 0 | Status: SHIPPED | OUTPATIENT
Start: 2024-06-21

## 2024-06-27 DIAGNOSIS — E11.65 TYPE 2 DIABETES MELLITUS WITH HYPERGLYCEMIA, UNSPECIFIED WHETHER LONG TERM INSULIN USE (MULTI): Primary | ICD-10-CM

## 2024-06-27 RX ORDER — PEN NEEDLE, DIABETIC 30 GX3/16"
1 NEEDLE, DISPOSABLE MISCELLANEOUS DAILY
Qty: 100 EACH | Refills: 3 | Status: SHIPPED | OUTPATIENT
Start: 2024-06-27 | End: 2024-06-28 | Stop reason: WASHOUT

## 2024-06-27 NOTE — TELEPHONE ENCOUNTER
"Left VM on refill line that she needs a refill of her pen needles.    Requested Prescriptions     Pending Prescriptions Disp Refills    pen needle, diabetic (BD Ultra-Fine Mini Pen Needle) 31 gauge x 3/16\" needle 100 each 3     Si each once daily. With insulin as directed       "

## 2024-06-28 DIAGNOSIS — E11.65 TYPE 2 DIABETES MELLITUS WITH HYPERGLYCEMIA, UNSPECIFIED WHETHER LONG TERM INSULIN USE (MULTI): ICD-10-CM

## 2024-06-28 LAB
LABORATORY COMMENT REPORT: NORMAL
PATH REPORT.FINAL DX SPEC: NORMAL
PATH REPORT.GROSS SPEC: NORMAL
PATH REPORT.MICROSCOPIC SPEC OTHER STN: NORMAL
PATH REPORT.RELEVANT HX SPEC: NORMAL
PATH REPORT.TOTAL CANCER: NORMAL

## 2024-06-28 RX ORDER — PEN NEEDLE, DIABETIC 30 GX3/16"
NEEDLE, DISPOSABLE MISCELLANEOUS
Qty: 100 EACH | Refills: 3 | Status: SHIPPED | OUTPATIENT
Start: 2024-06-28

## 2024-07-23 ENCOUNTER — APPOINTMENT (OUTPATIENT)
Dept: PRIMARY CARE | Facility: CLINIC | Age: 79
End: 2024-07-23
Payer: MEDICARE

## 2024-07-23 DIAGNOSIS — I10 PRIMARY HYPERTENSION: ICD-10-CM

## 2024-07-23 DIAGNOSIS — I10 HYPERTENSION, UNSPECIFIED TYPE: ICD-10-CM

## 2024-07-23 DIAGNOSIS — K21.9 GASTROESOPHAGEAL REFLUX DISEASE, UNSPECIFIED WHETHER ESOPHAGITIS PRESENT: ICD-10-CM

## 2024-07-23 DIAGNOSIS — E78.5 HYPERLIPIDEMIA LDL GOAL <100: ICD-10-CM

## 2024-07-25 ENCOUNTER — APPOINTMENT (OUTPATIENT)
Dept: RADIOLOGY | Facility: HOSPITAL | Age: 79
End: 2024-07-25
Payer: MEDICARE

## 2024-07-25 ENCOUNTER — HOSPITAL ENCOUNTER (EMERGENCY)
Facility: HOSPITAL | Age: 79
Discharge: HOME | End: 2024-07-25
Attending: STUDENT IN AN ORGANIZED HEALTH CARE EDUCATION/TRAINING PROGRAM
Payer: MEDICARE

## 2024-07-25 VITALS
BODY MASS INDEX: 30.73 KG/M2 | DIASTOLIC BLOOD PRESSURE: 66 MMHG | SYSTOLIC BLOOD PRESSURE: 137 MMHG | TEMPERATURE: 97.5 F | WEIGHT: 167 LBS | OXYGEN SATURATION: 97 % | RESPIRATION RATE: 18 BRPM | HEART RATE: 88 BPM | HEIGHT: 62 IN

## 2024-07-25 DIAGNOSIS — R10.13 ABDOMINAL PAIN, EPIGASTRIC: Primary | ICD-10-CM

## 2024-07-25 LAB
ABO GROUP (TYPE) IN BLOOD: NORMAL
ALBUMIN SERPL BCP-MCNC: 4.4 G/DL (ref 3.4–5)
ALP SERPL-CCNC: 61 U/L (ref 33–136)
ALT SERPL W P-5'-P-CCNC: 14 U/L (ref 7–45)
ANION GAP SERPL CALC-SCNC: 16 MMOL/L (ref 10–20)
ANTIBODY SCREEN: NORMAL
APPEARANCE UR: CLEAR
AST SERPL W P-5'-P-CCNC: 17 U/L (ref 9–39)
BASOPHILS # BLD AUTO: 0.09 X10*3/UL (ref 0–0.1)
BASOPHILS NFR BLD AUTO: 1 %
BILIRUB SERPL-MCNC: 0.3 MG/DL (ref 0–1.2)
BILIRUB UR STRIP.AUTO-MCNC: NEGATIVE MG/DL
BUN SERPL-MCNC: 20 MG/DL (ref 6–23)
CALCIUM SERPL-MCNC: 10 MG/DL (ref 8.6–10.3)
CHLORIDE SERPL-SCNC: 100 MMOL/L (ref 98–107)
CO2 SERPL-SCNC: 25 MMOL/L (ref 21–32)
COLOR UR: ABNORMAL
CREAT SERPL-MCNC: 1.26 MG/DL (ref 0.5–1.05)
EGFRCR SERPLBLD CKD-EPI 2021: 44 ML/MIN/1.73M*2
EOSINOPHIL # BLD AUTO: 0.07 X10*3/UL (ref 0–0.4)
EOSINOPHIL NFR BLD AUTO: 0.8 %
ERYTHROCYTE [DISTWIDTH] IN BLOOD BY AUTOMATED COUNT: 13.9 % (ref 11.5–14.5)
GLUCOSE SERPL-MCNC: 120 MG/DL (ref 74–99)
GLUCOSE UR STRIP.AUTO-MCNC: NORMAL MG/DL
HCT VFR BLD AUTO: 41.1 % (ref 36–46)
HGB BLD-MCNC: 13.4 G/DL (ref 12–16)
IMM GRANULOCYTES # BLD AUTO: 0.04 X10*3/UL (ref 0–0.5)
IMM GRANULOCYTES NFR BLD AUTO: 0.4 % (ref 0–0.9)
KETONES UR STRIP.AUTO-MCNC: NEGATIVE MG/DL
LEUKOCYTE ESTERASE UR QL STRIP.AUTO: NEGATIVE
LIPASE SERPL-CCNC: 31 U/L (ref 9–82)
LYMPHOCYTES # BLD AUTO: 2.7 X10*3/UL (ref 0.8–3)
LYMPHOCYTES NFR BLD AUTO: 29.9 %
MCH RBC QN AUTO: 28 PG (ref 26–34)
MCHC RBC AUTO-ENTMCNC: 32.6 G/DL (ref 32–36)
MCV RBC AUTO: 86 FL (ref 80–100)
MONOCYTES # BLD AUTO: 0.43 X10*3/UL (ref 0.05–0.8)
MONOCYTES NFR BLD AUTO: 4.8 %
MUCOUS THREADS #/AREA URNS AUTO: ABNORMAL /LPF
NEUTROPHILS # BLD AUTO: 5.7 X10*3/UL (ref 1.6–5.5)
NEUTROPHILS NFR BLD AUTO: 63.1 %
NITRITE UR QL STRIP.AUTO: NEGATIVE
NRBC BLD-RTO: 0 /100 WBCS (ref 0–0)
PH UR STRIP.AUTO: 6.5 [PH]
PLATELET # BLD AUTO: 333 X10*3/UL (ref 150–450)
POTASSIUM SERPL-SCNC: 4.1 MMOL/L (ref 3.5–5.3)
PROT SERPL-MCNC: 7.2 G/DL (ref 6.4–8.2)
PROT UR STRIP.AUTO-MCNC: NEGATIVE MG/DL
RBC # BLD AUTO: 4.78 X10*6/UL (ref 4–5.2)
RBC # UR STRIP.AUTO: ABNORMAL /UL
RBC #/AREA URNS AUTO: ABNORMAL /HPF
RH FACTOR (ANTIGEN D): NORMAL
SODIUM SERPL-SCNC: 137 MMOL/L (ref 136–145)
SP GR UR STRIP.AUTO: 1.02
UROBILINOGEN UR STRIP.AUTO-MCNC: NORMAL MG/DL
WBC # BLD AUTO: 9 X10*3/UL (ref 4.4–11.3)
WBC #/AREA URNS AUTO: ABNORMAL /HPF

## 2024-07-25 PROCEDURE — 80053 COMPREHEN METABOLIC PANEL: CPT | Performed by: STUDENT IN AN ORGANIZED HEALTH CARE EDUCATION/TRAINING PROGRAM

## 2024-07-25 PROCEDURE — 81001 URINALYSIS AUTO W/SCOPE: CPT | Performed by: STUDENT IN AN ORGANIZED HEALTH CARE EDUCATION/TRAINING PROGRAM

## 2024-07-25 PROCEDURE — 36415 COLL VENOUS BLD VENIPUNCTURE: CPT | Performed by: STUDENT IN AN ORGANIZED HEALTH CARE EDUCATION/TRAINING PROGRAM

## 2024-07-25 PROCEDURE — 2550000001 HC RX 255 CONTRASTS: Performed by: STUDENT IN AN ORGANIZED HEALTH CARE EDUCATION/TRAINING PROGRAM

## 2024-07-25 PROCEDURE — 99284 EMERGENCY DEPT VISIT MOD MDM: CPT | Mod: 25

## 2024-07-25 PROCEDURE — 74174 CTA ABD&PLVS W/CONTRAST: CPT | Performed by: RADIOLOGY

## 2024-07-25 PROCEDURE — 74174 CTA ABD&PLVS W/CONTRAST: CPT

## 2024-07-25 PROCEDURE — 85025 COMPLETE CBC W/AUTO DIFF WBC: CPT | Performed by: STUDENT IN AN ORGANIZED HEALTH CARE EDUCATION/TRAINING PROGRAM

## 2024-07-25 PROCEDURE — 86901 BLOOD TYPING SEROLOGIC RH(D): CPT | Performed by: STUDENT IN AN ORGANIZED HEALTH CARE EDUCATION/TRAINING PROGRAM

## 2024-07-25 PROCEDURE — 2500000004 HC RX 250 GENERAL PHARMACY W/ HCPCS (ALT 636 FOR OP/ED): Performed by: STUDENT IN AN ORGANIZED HEALTH CARE EDUCATION/TRAINING PROGRAM

## 2024-07-25 PROCEDURE — 83690 ASSAY OF LIPASE: CPT | Performed by: STUDENT IN AN ORGANIZED HEALTH CARE EDUCATION/TRAINING PROGRAM

## 2024-07-25 RX ORDER — FAMOTIDINE 20 MG/1
20 TABLET, FILM COATED ORAL DAILY
Qty: 30 TABLET | Refills: 0 | Status: SHIPPED | OUTPATIENT
Start: 2024-07-25 | End: 2024-08-02 | Stop reason: ALTCHOICE

## 2024-07-25 RX ADMIN — IOHEXOL 100 ML: 350 INJECTION, SOLUTION INTRAVENOUS at 18:59

## 2024-07-25 RX ADMIN — SODIUM CHLORIDE 1000 ML: 9 INJECTION, SOLUTION INTRAVENOUS at 18:34

## 2024-07-25 ASSESSMENT — PAIN DESCRIPTION - ORIENTATION: ORIENTATION: UPPER

## 2024-07-25 ASSESSMENT — COLUMBIA-SUICIDE SEVERITY RATING SCALE - C-SSRS
6. HAVE YOU EVER DONE ANYTHING, STARTED TO DO ANYTHING, OR PREPARED TO DO ANYTHING TO END YOUR LIFE?: NO
2. HAVE YOU ACTUALLY HAD ANY THOUGHTS OF KILLING YOURSELF?: NO
1. IN THE PAST MONTH, HAVE YOU WISHED YOU WERE DEAD OR WISHED YOU COULD GO TO SLEEP AND NOT WAKE UP?: NO

## 2024-07-25 ASSESSMENT — PAIN - FUNCTIONAL ASSESSMENT: PAIN_FUNCTIONAL_ASSESSMENT: 0-10

## 2024-07-25 ASSESSMENT — PAIN SCALES - GENERAL: PAINLEVEL_OUTOF10: 7

## 2024-07-25 ASSESSMENT — PAIN DESCRIPTION - LOCATION: LOCATION: ABDOMEN

## 2024-07-25 NOTE — ED PROVIDER NOTES
"EMERGENCY MEDICINE EVALUATION NOTE    History of Present Illness     Chief Complaint:   Chief Complaint   Patient presents with    Black or Bloody Stool       HPI: Nii Colunga is a 78 y.o. female with past medical history of diabetes and hypertension who presents with complaint of bloody stool.  Patient states for the past 6 weeks she has had some generalized abdominal pain mainly in the upper abdomen which she states is a constant dull pain and sensation.  She believes that she gets \"full quickly\" and does admit some associated nausea without emesis.  She also states over the past several days she has had intermittent episodes of black and tarry stools.  She states she has been taking Pepto-Bismol at home.  Denies any prior abdominal surgeries, fever, chills, dysuria, hematuria.  Denies anticoagulant usage other than BB aspirin daily.    Previous History     Past Medical History:   Diagnosis Date    Disorder of thyroid, unspecified 05/25/2016    Thyroid mass    Dry eye syndrome of bilateral lacrimal glands 09/13/2019    Bilateral dry eyes    Dry eye syndrome of bilateral lacrimal glands 09/13/2019    Bilateral dry eyes    Dry eye syndrome of bilateral lacrimal glands 09/13/2019    Bilateral dry eyes    Essential (primary) hypertension 08/23/2022    Hypertension    Personal history of malignant neoplasm of other parts of uterus 01/13/2016    History of cancer of uterus    Personal history of other endocrine, nutritional and metabolic disease     History of diabetes mellitus    Preglaucoma, unspecified, 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) 08/25/2016    Asthma    Vitreous degeneration, " 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    Bilateral vitreous detachment    Vitreous degeneration, 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    Bilateral vitreous detachment    Vitreous degeneration, 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    Bilateral vitreous detachment     Past Surgical History:   Procedure Laterality Date    HYSTERECTOMY  02/24/2016    Hysterectomy    THYROID SURGERY  04/05/2016    Thyroid Surgery    TUBAL LIGATION  01/13/2016    Tubal Ligation     Social History     Tobacco Use    Smoking status: Never     Passive exposure: Never    Smokeless tobacco: Never   Substance Use Topics    Alcohol use: Never    Drug use: Never     Family History   Problem Relation Name Age of Onset    Hypertension Mother      Other (Malignant Neoplasm) Mother      Ovarian cancer Mother      Hypertension Father      Diabetes Father      Other (Malignant Neoplasm) Father      Hypertension Sister      Other (Cardiac Disorder) Sister      Other (Malignant Neoplasm) Sister      Hypertension Brother      Diabetes Brother      Other (Malignant Neoplasm Of Breast) Sibling       No Known Allergies  Current Outpatient Medications   Medication Instructions    acetaminophen (Tylenol Extra Strength) 500 mg tablet TAKE 1 TABLET EVERY 4 TO 6 HOURS AS NEEDED.    albuterol (ProAir HFA) 90 mcg/actuation inhaler 2 puffs, inhalation, Every 4 hours PRN    amLODIPine (NORVASC) 10 mg, oral, Daily    aspirin 81 mg EC tablet 1 tablet, oral, Daily    aspirin-calcium carbonate 81 mg-300 mg calcium(777 mg) tablet 1 tablet, oral, Daily    blood sugar diagnostic strip Use to check blood sugar 3 times per day    cyclobenzaprine (FLEXERIL) 10 mg, oral, Nightly    famotidine (PEPCID) 20 mg, oral, Daily    fluticasone (Flonase) 50 mcg/actuation nasal spray 2 sprays, Each Nostril, Daily    glipiZIDE (GLUCOTROL) 10 mg, oral, 2 times daily before meals    insulin glargine (LANTUS U-100 INSULIN) 10 Units, subcutaneous, Daily, Take as directed per insulin instructions.    lancets (OneTouch Delica Lancets) 33 gauge  "misc 1 each, 3 times daily    Lantus Solostar U-100 Insulin 100 unit/mL (3 mL) pen INJECT 15 UNITS SUBCUTANEOUSLY AT BEDTIME    latanoprost (Xalatan) 0.005 % ophthalmic solution INSTILL 1 DROP INTO EACH EYE AT BEDTIME    lidocaine (Lidoderm) 5 % patch Use as directed on package    lisinopriL-hydrochlorothiazide 20-25 mg tablet 1 tablet, oral, Daily    metFORMIN (GLUCOPHAGE) 1,000 mg, oral, 2 times daily (morning and late afternoon)    metoprolol succinate XL (TOPROL-XL) 50 mg, oral, Daily, Do not crush or chew.    pantoprazole (PROTONIX) 40 mg, oral, Daily, Do not crush, chew, or split.    pen needle, diabetic (BD Ultra-Fine Terri Pen Needle) 32 gauge x 5/32\" needle One once daily with insulin injection.    polyethylene glycol-electrolytes 420 gram solution STARTING AT 6PM DRINK UNTIL YOU GET TO HALF OF THE BOTTLE.  DRINK THE OTHER HALF 5HRS BEFORE ARRIVAL TIME.    simvastatin (ZOCOR) 20 mg, oral, Daily    Trulicity 4.5 mg, subcutaneous, Once Weekly       Physical Exam     Appearance: Alert, oriented , cooperative,  in acute distress. Well nourished & well hydrated.     Skin: Intact,  dry skin, no lesions, rash, petechiae or purpura.      Eyes: PERRLA, EOMs intact,  Conjunctiva pink with no redness or exudates. Cornea & anterior chamber are clear, Eyelids without lesions. No scleral icterus.      ENT: Hearing grossly intact. External auditory canals patent, tympanic membranes intact with visible landmarks. Nares patent, mucus membranes moist. Dentition without lesions. Pharynx clear, uvula midline.      Neck: Supple, without meningismus. Thyroid not palpable. Trachea at midline. No lymphadenopathy.     Pulmonary: Clear bilaterally with good chest wall excursion. No rales, rhonchi or wheezing. No accessory muscle use or stridor.     Cardiac: Normal S1, S2 without murmur, rub, gallop or extrasystole. No JVD, Carotids without bruits.     Abdomen: Soft, moderate generalized abdominal tenderness, active bowel sounds.  No " palpable organomegaly.  No rebound or guarding.  No CVA tenderness.     Genitourinary: Exam deferred.     Musculoskeletal: Full range of motion. no pain, edema, or deformity. Pulses full and equal. No cyanosis or clubbing.      Neurological:  Cranial nerves II through XII are grossly intact, finger-nose touch is normal, normal sensation, no weakness, no focal findings identified.     Psychiatric: Appropriate mood and affect.      Results     Labs Reviewed   COMPREHENSIVE METABOLIC PANEL - Abnormal       Result Value    Glucose 120 (*)     Sodium 137      Potassium 4.1      Chloride 100      Bicarbonate 25      Anion Gap 16      Urea Nitrogen 20      Creatinine 1.26 (*)     eGFR 44 (*)     Calcium 10.0      Albumin 4.4      Alkaline Phosphatase 61      Total Protein 7.2      AST 17      Bilirubin, Total 0.3      ALT 14     CBC WITH AUTO DIFFERENTIAL - Abnormal    WBC 9.0      nRBC 0.0      RBC 4.78      Hemoglobin 13.4      Hematocrit 41.1      MCV 86      MCH 28.0      MCHC 32.6      RDW 13.9      Platelets 333      Neutrophils % 63.1      Immature Granulocytes %, Automated 0.4      Lymphocytes % 29.9      Monocytes % 4.8      Eosinophils % 0.8      Basophils % 1.0      Neutrophils Absolute 5.70 (*)     Immature Granulocytes Absolute, Automated 0.04      Lymphocytes Absolute 2.70      Monocytes Absolute 0.43      Eosinophils Absolute 0.07      Basophils Absolute 0.09     URINALYSIS WITH REFLEX CULTURE AND MICROSCOPIC - Abnormal    Color, Urine Light-Yellow      Appearance, Urine Clear      Specific Gravity, Urine 1.017      pH, Urine 6.5      Protein, Urine NEGATIVE      Glucose, Urine Normal      Blood, Urine 0.06 (1+) (*)     Ketones, Urine NEGATIVE      Bilirubin, Urine NEGATIVE      Urobilinogen, Urine Normal      Nitrite, Urine NEGATIVE      Leukocyte Esterase, Urine NEGATIVE     URINALYSIS MICROSCOPIC WITH REFLEX CULTURE - Abnormal    WBC, Urine 1-5      RBC, Urine 6-10 (*)     Mucus, Urine FEW     LIPASE -  "Normal    Lipase 31      Narrative:     Venipuncture immediately after or during the administration of Metamizole may lead to falsely low results. Testing should be performed immediately prior to Metamizole dosing.   TYPE AND SCREEN    ABO TYPE O      Rh TYPE POS      ANTIBODY SCREEN NEG     URINALYSIS WITH REFLEX CULTURE AND MICROSCOPIC    Narrative:     The following orders were created for panel order Urinalysis with Reflex Culture and Microscopic.  Procedure                               Abnormality         Status                     ---------                               -----------         ------                     Urinalysis with Reflex C...[[id number]]  Abnormal            Final result               Extra Urine Gray Tube[[id number]]                            In process                   Please view results for these tests on the individual orders.   EXTRA URINE GRAY TUBE     CT angio abdomen pelvis w and or wo IV IV contrast   Final Result   1. No evidence of active gastrointestinal hemorrhage. If ongoing   concern for GI bleed then a nuclear medicine GI bleeding scan may be   performed in further assessment.   2. No acute process within the abdomen or pelvis.   3. Hepatic steatosis.                            Signed by: Alcon Booth 7/25/2024 7:38 PM   Dictation workstation:   [id number]            ED Course & Medical Decision Making     Medications   sodium chloride 0.9 % bolus 1,000 mL (0 mL intravenous Stopped 7/25/24 1904)   iohexol (OMNIPaque) 350 mg iodine/mL solution 100 mL (100 mL intravenous Given 7/25/24 1859)     Diagnoses as of 07/25/24 2010   Abdominal pain, epigastric     Heart Rate:  [92]   Temperature:  [36.2 °C (97.2 °F)]   Respirations:  [18]   BP: (158)/(83)   Height:  [157.5 cm (5' 2\")]   Weight:  [75.8 kg (167 lb)]   Pulse Ox:  [97 %-99 %]      Patient initially hemodynamically stable and afebrile.  She does have some generalized abdominal tenderness on examination mainly in the " epigastric region.  Differential includes but is not limited to acute gastritis, upper gastrointestinal hemorrhage, gastric ulcer, or other intra-abdominal pathology.  Patient's dark stool also could be secondary to Pepto-Bismol ingestion.  Lab work was reassuring with no significant elevated BUN and baseline renal function.  No significant transaminitis with normal bilirubin.  Lipase normal.  No leukocytosis or anemia.  Urinalysis nonacute.  CT scan of the abdomen pelvis with angiography was completed no evidence of active GI bleeding or other acute process.  This could be secondary to mild gastritis.  She was given 1 L normal saline initially for fluid resuscitation.  She did have significant proved on reexamination and did feel stable from discharge at this time.  She was informed that Pepto-Bismol likely made her stool dark and she will follow-up with a gastroenterologist and was given a referral for this for possible evaluation of underlying gastritis.  She was started on daily Pepcid for home use.    Procedures   Procedures    Diagnosis     1. Abdominal pain, epigastric        Disposition     DISCHARGE.  The patient is discharged back to their place of residence.  Discharge diagnosis, instructions and plan were discussed and understood. At the time of discharge the patient was comfortable and was in no apparent distress. Patient is aware of diagnostic uncertainty and was notified though testing is negative here, there is a very small chance that pathology may be missed.  The patient understands these risks and the patient /family understood to return immediately to the emergency department if the symptoms worsen or if they have any additional concerns.    FOLLOW UP  Primary care provider in 1-2 days.        ED Prescriptions       Medication Sig Dispense Start Date End Date Auth. Provider    famotidine (Pepcid) 20 mg tablet Take 1 tablet (20 mg) by mouth once daily. 30 tablet 7/25/2024 8/24/2024 Juan C BROWN  DO Juan C Rodríguez DO  07/25/24 2012

## 2024-07-25 NOTE — ED TRIAGE NOTES
Pt presents with the c/o black tarry stools along with upper abd pain for about 6 weeks now. Pt states that she also notices when she eats she gets full quickly.

## 2024-07-26 LAB — HOLD SPECIMEN: NORMAL

## 2024-07-30 RX ORDER — LISINOPRIL AND HYDROCHLOROTHIAZIDE 20; 25 MG/1; MG/1
1 TABLET ORAL DAILY
Qty: 90 TABLET | Refills: 0 | OUTPATIENT
Start: 2024-07-30 | End: 2024-08-29

## 2024-07-30 RX ORDER — SIMVASTATIN 20 MG/1
20 TABLET, FILM COATED ORAL DAILY
Qty: 90 TABLET | Refills: 0 | OUTPATIENT
Start: 2024-07-30

## 2024-07-30 RX ORDER — METOPROLOL SUCCINATE 50 MG/1
50 TABLET, EXTENDED RELEASE ORAL DAILY
Qty: 90 TABLET | Refills: 0 | OUTPATIENT
Start: 2024-07-30

## 2024-07-30 RX ORDER — PANTOPRAZOLE SODIUM 40 MG/1
40 TABLET, DELAYED RELEASE ORAL DAILY
Qty: 90 TABLET | Refills: 0 | OUTPATIENT
Start: 2024-07-30

## 2024-07-31 PROBLEM — N18.32 STAGE 3B CHRONIC KIDNEY DISEASE (MULTI): Status: ACTIVE | Noted: 2024-07-31

## 2024-07-31 NOTE — ASSESSMENT & PLAN NOTE
A1C 6.5 on 1/2024- well controlled   CMP on 7/2024  --Creatinine 1.26  --GFR 44  Urine protein on 1/2024 WNL  --Albumin 20.5  --Creatinine 177.8  --A/C ratio 11.5  Trulicity 4.5 mg weekly  Metformin 1000 mg BID  Lantus 15U at bedtime  Blood sugars are stable at home - her sugar this morning was 127    Advised to continue low carb diet and exercise as tolerated

## 2024-07-31 NOTE — ASSESSMENT & PLAN NOTE
CMP on 7/2024  --Creatinine 1.26  --GFR 44  Urine protein on 1/2024 WNL  --Albumin 20.5  --Creatinine 177.8  --A/C ratio 11.5     Recommend eating a healthy low sodium diet, avoid medications which can harm the kidneys such as NSAIDs (ibuprofen, Aleve, Motrin, diclofenac, Advil) and drinking adequate quantities of water to remain hydrated. It is also important to keep blood pressures and blood sugars well controlled to avoid further decline in kidney function.

## 2024-07-31 NOTE — PROGRESS NOTES
Patient ID:   Nii Colunga is a 78 y.o. female with PMH remarkable for T2DM, HTN, HLD who presents today for a virtual visit for medication refills.  No chief complaint on file..    HEALTH MAINTENANCE: Establish Care  Previous PCP: Dr. Owen Izquierdo   Last Office Visit: 2024  Mammogram (40-75): 2023 cat 3; recommended repeat in 6 months -> ordered but not completed   Pap smear (21-65, or hysterectomy q5yrs):  Last Labs: 2024  Colonoscopy (45-75 or age 40 with 1st degree relative dx colon ca): last scope on 10/2018  -reordered 2024  Lung cancer screening (50-76 y/o + 20 pack year + smoking/quit in last 15 years):   Cardiac Calcium Scoring (55+, q 10 years):  DEXA (65+, q 2 years):     Patient identified by name and   Provider identified by name and role     HPI  Diabetes  A1C 6.5 on 2024  CMP on 2024  --Creatinine 1.26  --GFR 44  Urine protein on 2024 WNL  --Albumin 20.5  --Creatinine 177.8  --A/C ratio 11.5  Trulicity 4.5 mg weekly  Metformin 1000 mg BID  Lantus 15U at bedtime  Blood sugars at home stable  127 this morning     CKD stage 3  CMP on 2024  --Creatinine 1.26  --GFR 44  Urine protein on 2024 WNL  --Albumin 20.5  --Creatinine 177.8  --A/C ratio 11.5    Hypertension  BP logs at home stable   Denies headaches, dizziness, shortness of breath, palpitations and chest pain  Amlodipine 10 mg every day   Lisinopril-hydrochlorothiazide 20-25 mg every day  Metoprolol 50 mg every day     Hyperlipidemia   Last Lipid panel 2024 WNL   Simvastatin 20 mg every day     Social History     Tobacco Use    Smoking status: Never     Passive exposure: Never    Smokeless tobacco: Never   Substance Use Topics    Alcohol use: Never    Drug use: Never       Immunization History   Administered Date(s) Administered    Flu vaccine, quadrivalent, high-dose, preservative free, age 65y+ (FLUZONE) 10/19/2022, 10/26/2023    Flu vaccine, quadrivalent, recombinant, preservative free, adult (FLUBLOK) 09/10/2020,  10/06/2021    Influenza Whole 10/06/2015    Influenza, High Dose Seasonal, Preservative Free 10/13/2016, 10/10/2017    Influenza, Unspecified 12/01/1999, 11/01/2001, 10/27/2003, 12/29/2004, 01/27/2005, 10/25/2006, 10/30/2007, 11/19/2008, 10/16/2009, 11/30/2010, 10/20/2011, 10/08/2012, 10/09/2013, 10/14/2014, 10/19/2022    Moderna COVID-19 vaccine, Fall 2023, 12 yeasrs and older (50mcg/0.5mL) 12/11/2023    Moderna COVID-19 vaccine, bivalent, blue cap/gray label *Check age/dose* 12/22/2022    Moderna SARS-CoV-2 Booster 12/22/2022    Moderna SARS-CoV-2 Vaccination 01/26/2021, 02/23/2021, 11/21/2021, 07/22/2022    PPD Test 12/01/1999    Pneumococcal Conjugate, Unspecified 11/28/2016    Pneumococcal conjugate vaccine, 13-valent (PREVNAR 13) 11/28/2016    Pneumococcal polysaccharide vaccine, 23-valent, age 2 years and older (PNEUMOVAX 23) 07/01/2009, 08/16/2018    Pneumococcal, Unspecified 07/01/2009    Td (adult) 01/01/1996    Tdap vaccine, age 7 year and older (BOOSTRIX, ADACEL) 02/26/2008    Zoster vaccine, recombinant, adult (SHINGRIX) 07/29/2021, 01/11/2022       Review of Systems   Constitutional: Negative.  Negative for fatigue.   Respiratory: Negative.  Negative for cough and shortness of breath.    Cardiovascular: Negative.  Negative for chest pain, palpitations and leg swelling.   Gastrointestinal: Negative.  Negative for abdominal distention, abdominal pain, blood in stool, constipation, diarrhea, nausea and vomiting.   Endocrine: Negative.    Genitourinary: Negative.    Neurological: Negative.  Negative for dizziness, light-headedness and headaches.   Psychiatric/Behavioral: Negative.         No Known Allergies     Visit Vitals  Smoking Status Never       Physical Exam    Current Outpatient Medications   Medication Instructions    acetaminophen (Tylenol Extra Strength) 500 mg tablet TAKE 1 TABLET EVERY 4 TO 6 HOURS AS NEEDED.    albuterol (ProAir HFA) 90 mcg/actuation inhaler 2 puffs, inhalation, Every 4 hours  "PRN    amLODIPine (NORVASC) 10 mg, oral, Daily    aspirin 81 mg EC tablet 1 tablet, oral, Daily    blood sugar diagnostic strip Use to check blood sugar 3 times per day    cyclobenzaprine (FLEXERIL) 10 mg, oral, Nightly    glipiZIDE (GLUCOTROL) 10 mg, oral, 2 times daily before meals    lancets (OneTouch Delica Lancets) 33 gauge misc 1 each, 3 times daily    Lantus Solostar U-100 Insulin 100 unit/mL (3 mL) pen INJECT 15 UNITS SUBCUTANEOUSLY AT BEDTIME    latanoprost (Xalatan) 0.005 % ophthalmic solution INSTILL 1 DROP INTO EACH EYE AT BEDTIME    lisinopriL-hydrochlorothiazide 20-25 mg tablet 1 tablet, oral, Daily    metFORMIN (GLUCOPHAGE) 1,000 mg, oral, 2 times daily (morning and late afternoon)    metoprolol succinate XL (TOPROL-XL) 50 mg, oral, Daily, Do not crush or chew.    multivitamin tablet 1 tablet, oral, Daily    pantoprazole (PROTONIX) 40 mg, oral, Daily, Do not crush, chew, or split.    pen needle, diabetic (BD Ultra-Fine Terri Pen Needle) 32 gauge x 5/32\" needle One once daily with insulin injection.    polyethylene glycol-electrolytes 420 gram solution STARTING AT 6PM DRINK UNTIL YOU GET TO HALF OF THE BOTTLE.  DRINK THE OTHER HALF 5HRS BEFORE ARRIVAL TIME.    simvastatin (ZOCOR) 20 mg, oral, Daily    Trulicity 4.5 mg, subcutaneous, Once Weekly       Lab Results   Component Value Date    WBC 9.0 07/25/2024    HGB 13.4 07/25/2024    HCT 41.1 07/25/2024     07/25/2024    CHOL 109 01/18/2024    TRIG 98 07/27/2023    HDL 47.8 01/18/2024    LDLDIRECT 50 01/18/2024    ALT 14 07/25/2024    AST 17 07/25/2024     07/25/2024    K 4.1 07/25/2024     07/25/2024    CREATININE 1.26 (H) 07/25/2024    BUN 20 07/25/2024    CO2 25 07/25/2024    TSH 1.48 01/18/2024    HGBA1C 6.5 (H) 01/18/2024       ASSESSMENT AND PLAN:  Assessment/Plan   Problem List Items Addressed This Visit             ICD-10-CM    Esophageal reflux K21.9     Nii was recently in the ER for abdominal pain due to being without her " Pantoprazole.   She now has refills, and was also started on Pepcid (which she is no longer taking) and is denying any abdominal pain, dark tarry stools, nausea, vomiting, constipation or diarrhea at this time.   -Referral for GI was placed from ER provider          Relevant Medications    pantoprazole (ProtoNix) 40 mg EC tablet    Hyperlipidemia LDL goal <100 E78.5     Last lipid panel 1/2024 WNL  Simvastatin 20 mg every day   -heart jamie diet, low fat, low cholesterol   -Exercise as tolerated  -Increase fiber           Hypertension I10     BP logs  Amlodipine 10 mg every day   Lisinopril-hydrochlorothiazide 20-25 mg every day  Metoprolol 50 mg every day     Low sodium diet  Exercise as tolerated          Relevant Medications    metoprolol succinate XL (Toprol-XL) 50 mg 24 hr tablet    lisinopriL-hydrochlorothiazide 20-25 mg tablet    Type 2 diabetes mellitus (Multi) E11.9     A1C 6.5 on 1/2024- well controlled   CMP on 7/2024  --Creatinine 1.26  --GFR 44  Urine protein on 1/2024 WNL  --Albumin 20.5  --Creatinine 177.8  --A/C ratio 11.5  Trulicity 4.5 mg weekly  Metformin 1000 mg BID  Lantus 15U at bedtime  Blood sugars are stable at home - her sugar this morning was 127    Advised to continue low carb diet and exercise as tolerated          Relevant Medications    glipiZIDE (Glucotrol) 10 mg tablet    Stage 3b chronic kidney disease (Multi) - Primary N18.32     CMP on 7/2024  --Creatinine 1.26  --GFR 44  Urine protein on 1/2024 WNL  --Albumin 20.5  --Creatinine 177.8  --A/C ratio 11.5     Recommend eating a healthy low sodium diet, avoid medications which can harm the kidneys such as NSAIDs (ibuprofen, Aleve, Motrin, diclofenac, Advil) and drinking adequate quantities of water to remain hydrated. It is also important to keep blood pressures and blood sugars well controlled to avoid further decline in kidney function.                Assessment/Plan   --------------------    Follow up with PCP in October as  scheduled

## 2024-07-31 NOTE — ASSESSMENT & PLAN NOTE
BP logs  Amlodipine 10 mg every day   Lisinopril-hydrochlorothiazide 20-25 mg every day  Metoprolol 50 mg every day     Low sodium diet  Exercise as tolerated

## 2024-07-31 NOTE — ASSESSMENT & PLAN NOTE
Last lipid panel 1/2024 WNL  Simvastatin 20 mg every day   -heart jamie diet, low fat, low cholesterol   -Exercise as tolerated  -Increase fiber

## 2024-08-02 ENCOUNTER — TELEMEDICINE (OUTPATIENT)
Dept: PRIMARY CARE | Facility: CLINIC | Age: 79
End: 2024-08-02
Payer: MEDICARE

## 2024-08-02 DIAGNOSIS — N18.32 STAGE 3B CHRONIC KIDNEY DISEASE (MULTI): Primary | ICD-10-CM

## 2024-08-02 DIAGNOSIS — I10 HYPERTENSION, UNSPECIFIED TYPE: ICD-10-CM

## 2024-08-02 DIAGNOSIS — K21.9 GASTROESOPHAGEAL REFLUX DISEASE, UNSPECIFIED WHETHER ESOPHAGITIS PRESENT: ICD-10-CM

## 2024-08-02 DIAGNOSIS — Z79.4 TYPE 2 DIABETES MELLITUS WITHOUT COMPLICATION, WITH LONG-TERM CURRENT USE OF INSULIN (MULTI): ICD-10-CM

## 2024-08-02 DIAGNOSIS — I10 PRIMARY HYPERTENSION: ICD-10-CM

## 2024-08-02 DIAGNOSIS — E11.9 TYPE 2 DIABETES MELLITUS WITHOUT COMPLICATION, WITH LONG-TERM CURRENT USE OF INSULIN (MULTI): ICD-10-CM

## 2024-08-02 DIAGNOSIS — E78.5 HYPERLIPIDEMIA LDL GOAL <100: ICD-10-CM

## 2024-08-02 PROCEDURE — 99442 PR PHYS/QHP TELEPHONE EVALUATION 11-20 MIN: CPT

## 2024-08-02 PROCEDURE — 1160F RVW MEDS BY RX/DR IN RCRD: CPT

## 2024-08-02 PROCEDURE — 1159F MED LIST DOCD IN RCRD: CPT

## 2024-08-02 PROCEDURE — 1036F TOBACCO NON-USER: CPT

## 2024-08-02 RX ORDER — GLIPIZIDE 10 MG/1
10 TABLET ORAL
Qty: 180 TABLET | Refills: 1 | Status: SHIPPED | OUTPATIENT
Start: 2024-08-02

## 2024-08-02 RX ORDER — PANTOPRAZOLE SODIUM 40 MG/1
40 TABLET, DELAYED RELEASE ORAL DAILY
Qty: 90 TABLET | Refills: 1 | Status: SHIPPED | OUTPATIENT
Start: 2024-08-02

## 2024-08-02 RX ORDER — LISINOPRIL AND HYDROCHLOROTHIAZIDE 20; 25 MG/1; MG/1
1 TABLET ORAL DAILY
Qty: 90 TABLET | Refills: 1 | Status: SHIPPED | OUTPATIENT
Start: 2024-08-02 | End: 2025-01-29

## 2024-08-02 RX ORDER — METOPROLOL SUCCINATE 50 MG/1
50 TABLET, EXTENDED RELEASE ORAL DAILY
Qty: 90 TABLET | Refills: 1 | Status: SHIPPED | OUTPATIENT
Start: 2024-08-02

## 2024-08-02 RX ORDER — BISMUTH SUBSALICYLATE 262 MG
1 TABLET,CHEWABLE ORAL DAILY
COMMUNITY

## 2024-08-02 ASSESSMENT — ENCOUNTER SYMPTOMS
CONSTITUTIONAL NEGATIVE: 1
HEADACHES: 0
ABDOMINAL PAIN: 0
DIZZINESS: 0
GASTROINTESTINAL NEGATIVE: 1
RESPIRATORY NEGATIVE: 1
DIARRHEA: 0
COUGH: 0
BLOOD IN STOOL: 0
ABDOMINAL DISTENTION: 0
SHORTNESS OF BREATH: 0
CONSTIPATION: 0
VOMITING: 0
NAUSEA: 0
CARDIOVASCULAR NEGATIVE: 1
ENDOCRINE NEGATIVE: 1
PALPITATIONS: 0
NEUROLOGICAL NEGATIVE: 1
LIGHT-HEADEDNESS: 0
FATIGUE: 0
PSYCHIATRIC NEGATIVE: 1

## 2024-08-02 NOTE — ASSESSMENT & PLAN NOTE
Nii was recently in the ER for abdominal pain due to being without her Pantoprazole.   She now has refills, and was also started on Pepcid (which she is no longer taking) and is denying any abdominal pain, dark tarry stools, nausea, vomiting, constipation or diarrhea at this time.   -Referral for GI was placed from ER provider

## 2024-08-02 NOTE — PROGRESS NOTES
FUV for diabetes. LV with me 2024.    History of Present Illness  78 y.o. female with H/O type 2 diabetes, HTN, HLD.     Dx:   HbA1c: 7.5% (2024), 6.5% (2024), 6.7% (2023), 8.1% (2023), 8.2% (2023), 6.9% (2022), 7.7% (2022), 8.2% (2022), 8.5% (2021), 7.8% (2021)  Current regimen: glipizide 10 mg BID, metformin 1g BID, Lantus 10 units QHS, Trulicity 4.5 mg/week  Past medications: Januvia  Complications:none  Comorbidities: HTN, HLD     SMBG: twice day  Fasting: low 130s-160s  Bedtime: mid 100s (rarely checks)     Hypoglycemia: no    SMB-2 times per day    Diet: low-carb , Usually 2 meals / day (breakfast and dinner)   Sometimes she skips a meal and drinks Glucerna   Snacks all day: nuts, chips, ice cream bar  Loves potatoes  Takes coffee with cream usually once a day, diet soda  Drinks juice very occasionally.      Exercise: 1 hour on the bike every other day    TODAY:  -having abdominal fullness recently (last 2 months) and does not have an appetite  -eating what she can tolerate when she can eat  -feels constipated    ROS  General: no fever or chills  CV: no chest pain   Respiratory: no shortness of breath  MSK: no lower extremity edema  Neuro: no headache or dizziness  See HPI for Endocrine ROS    Past Medical History:   Diagnosis Date    Disorder of thyroid, unspecified 2016    Thyroid mass    Dry eye syndrome of bilateral lacrimal glands 2019    Bilateral dry eyes    Dry eye syndrome of bilateral lacrimal glands 2019    Bilateral dry eyes    Dry eye syndrome of bilateral lacrimal glands 2019    Bilateral dry eyes    Essential (primary) hypertension 2022    Hypertension    Personal history of malignant neoplasm of other parts of uterus 2016    History of cancer of uterus    Personal history of other endocrine, nutritional and metabolic disease     History of diabetes mellitus    Preglaucoma, unspecified, bilateral  "09/13/2019    Glaucoma suspect of both eyes    Preglaucoma, unspecified, 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    Glaucoma suspect of both eyes    Preglaucoma, unspecified, 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    Glaucoma suspect of both eyes    Ulcerative blepharitis unspecified eye, 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/13/2019    Blepharitis, ulcerative    Unspecified asthma, uncomplicated ([facility]) 08/25/2016    Asthma    Vitreous degeneration, 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    Bilateral vitreous detachment    Vitreous degeneration, 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    Bilateral vitreous detachment    Vitreous degeneration, 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    Bilateral vitreous detachment       Past Surgical History:   Procedure Laterality Date    HYSTERECTOMY  02/24/2016    Hysterectomy    THYROID SURGERY  04/05/2016    Thyroid Surgery    TUBAL LIGATION  01/13/2016    Tubal Ligation       Social History     Socioeconomic History    Marital status:      Spouse name: Not on file    Number of children: Not on file    Years of education: Not on file    Highest education level: Not on file   Occupational History    Not on file   Tobacco Use    Smoking status: Never     Passive exposure: Never    Smokeless tobacco: Never   Substance and Sexual Activity    Alcohol use: Never    Drug use: Never    Sexual activity: Not on file   Other Topics Concern    Not on file   Social History Narrative    Not on file     Social Determinants of Health     Financial Resource Strain: Not on file   Food Insecurity: Not on file   Transportation Needs: Not on file   Physical Activity: Not on file   Stress: Not on file   Social Connections: Not on file   Intimate Partner Violence: Not on file   Housing Stability: Not on file       Physical Exam   height is 1.575 m (5' 2\") and weight is 76.7 kg (169 lb). Her blood pressure is 135/77 and her pulse is 90.   General: not in acute distress  HEENT: JAYESH HERNANDEZ  Thyroid: no goiter  Neuro: alert and oriented x 3    Current Outpatient Medications   Medication Sig Dispense " "Refill    acetaminophen (Tylenol Extra Strength) 500 mg tablet TAKE 1 TABLET EVERY 4 TO 6 HOURS AS NEEDED.      albuterol (ProAir HFA) 90 mcg/actuation inhaler Inhale 2 puffs every 4 hours if needed for shortness of breath. 18 g 11    amLODIPine (Norvasc) 10 mg tablet Take 1 tablet (10 mg) by mouth once daily. 90 tablet 1    aspirin 81 mg EC tablet Take 1 tablet (81 mg) by mouth once daily.      blood sugar diagnostic strip Use to check blood sugar 3 times per day 300 each 0    cyclobenzaprine (Flexeril) 10 mg tablet Take 1 tablet (10 mg) by mouth once daily at bedtime.      dulaglutide (Trulicity) 4.5 mg/0.5 mL pen injector Inject 4.5 mg under the skin 1 (one) time per week. 6 mL 3    glipiZIDE (Glucotrol) 10 mg tablet Take 1 tablet (10 mg) by mouth 2 times a day before meals. 180 tablet 1    lancets (OneTouch Delica Lancets) 33 gauge misc 1 each 3 times a day.      Lantus Solostar U-100 Insulin 100 unit/mL (3 mL) pen INJECT 15 UNITS SUBCUTANEOUSLY AT BEDTIME 18 mL 0    latanoprost (Xalatan) 0.005 % ophthalmic solution INSTILL 1 DROP INTO EACH EYE AT BEDTIME 9 mL 0    lisinopriL-hydrochlorothiazide 20-25 mg tablet Take 1 tablet by mouth once daily. 90 tablet 1    metFORMIN (Glucophage) 1,000 mg tablet Take 1 tablet (1,000 mg) by mouth 2 times a day with meals. 180 tablet 1    metoprolol succinate XL (Toprol-XL) 50 mg 24 hr tablet Take 1 tablet (50 mg) by mouth once daily. Do not crush or chew. 90 tablet 1    multivitamin tablet Take 1 tablet by mouth once daily.      pantoprazole (ProtoNix) 40 mg EC tablet Take 1 tablet (40 mg) by mouth once daily. Do not crush, chew, or split. 90 tablet 1    pen needle, diabetic (BD Ultra-Fine Terri Pen Needle) 32 gauge x 5/32\" needle One once daily with insulin injection. 100 each 3    polyethylene glycol-electrolytes 420 gram solution STARTING AT 6PM DRINK UNTIL YOU GET TO HALF OF THE BOTTLE.  DRINK THE OTHER HALF 5HRS BEFORE ARRIVAL TIME. 4000 mL 0    simvastatin (Zocor) 20 mg " tablet Take 1 tablet (20 mg) by mouth once daily. 30 tablet 0     No current facility-administered medications for this visit.       Assessment and Plan  78 y.o. female with hx of T2DM, HTN, HLD, here for follow-up of T2DM.     1. Type 2 diabetes mellitus, uncontrolled  2. Hypertension  3. Hyperlipidemia  HbA1c: 7.5% (8/7/2024), 6.5% (01/2024), 6.7% (12/20/2023), 8.1% (5/18/2023), 8.2% (1/16/23), 6.9% (8/24/2022), 7.7% (5/18/2022), 8.2% (02/11/2022), 8.5% (9/15/2021), 7.8% (02/2021)  Current regimen: glipizide 10 mg BID, metformin 1g BID, Lantus 10 units QHS, Trulicity 4.5 mg/week  Eye exam: no DR (10/2023)-scheduled to see 10/24/2024  Urine microalbumin: 11.5 ug/mg (1/2024) on ACE-I  Lipids: HDL 48, LDL 50, TG NA (01/2024) on simvastatin 20 mg     A1c: 7.5% today.  Has been experiencing early satiety/fullness and anorexia over the past 2 months. She went to the ED on 7/25 for these symptoms. No acute findings on CT or labs. No pancreatitis. She has an appointment with GI next month for evaluation.  She has been on Trulicity 4.5 mg dose for almost a year now, so it would be unusual for Trulicity to cause side effects at this point. However, I did let her know that if GI evaluation does not reveal a cause and she continues to have these symptoms, one option would be to switch the Trulicity to a different GLP-1RA or dual agonist.  She is very hesitant to change medications and would like to stay on Trulicity. However, if we need to change, Ozempic may be better as she may need to enroll in PAP ([employer] does not have a patient assistance program at this time). I showed her what Ozempic pens look like and how it is used today.    Because of the fullness she has been feeling, she has not wanted to eat regular meals. She has been eating what she can (which tends to be high-carb) when she is able. Fasting BG is 30-40 mg/dL higher than LV.  Will increase Lantus.    PLAN:  -increase Lantus to 15 units at bedtime  -continue  Trulicity 4.5 mg/week  -continue glipizide, metformin  -continue to check blood sugars twice a day (fasting and bedtime)  -reapply for [employer] PAP at next visit (forms provided today)  -check HbA1c, urine microalbumin, lipids before next visit    Follow-up in 4 months (labs prior)

## 2024-08-07 ENCOUNTER — APPOINTMENT (OUTPATIENT)
Dept: ENDOCRINOLOGY | Facility: CLINIC | Age: 79
End: 2024-08-07
Payer: MEDICARE

## 2024-08-07 VITALS
DIASTOLIC BLOOD PRESSURE: 77 MMHG | WEIGHT: 169 LBS | SYSTOLIC BLOOD PRESSURE: 135 MMHG | HEIGHT: 62 IN | HEART RATE: 90 BPM | BODY MASS INDEX: 31.1 KG/M2

## 2024-08-07 DIAGNOSIS — E11.65 TYPE 2 DIABETES MELLITUS WITH HYPERGLYCEMIA, UNSPECIFIED WHETHER LONG TERM INSULIN USE (MULTI): ICD-10-CM

## 2024-08-07 LAB — POC HEMOGLOBIN A1C: 7.5 % (ref 4.2–6.5)

## 2024-08-07 PROCEDURE — 99215 OFFICE O/P EST HI 40 MIN: CPT | Performed by: STUDENT IN AN ORGANIZED HEALTH CARE EDUCATION/TRAINING PROGRAM

## 2024-08-07 PROCEDURE — 1036F TOBACCO NON-USER: CPT | Performed by: STUDENT IN AN ORGANIZED HEALTH CARE EDUCATION/TRAINING PROGRAM

## 2024-08-07 PROCEDURE — 3075F SYST BP GE 130 - 139MM HG: CPT | Performed by: STUDENT IN AN ORGANIZED HEALTH CARE EDUCATION/TRAINING PROGRAM

## 2024-08-07 PROCEDURE — 3078F DIAST BP <80 MM HG: CPT | Performed by: STUDENT IN AN ORGANIZED HEALTH CARE EDUCATION/TRAINING PROGRAM

## 2024-08-07 PROCEDURE — 83036 HEMOGLOBIN GLYCOSYLATED A1C: CPT | Performed by: STUDENT IN AN ORGANIZED HEALTH CARE EDUCATION/TRAINING PROGRAM

## 2024-08-07 PROCEDURE — 1159F MED LIST DOCD IN RCRD: CPT | Performed by: STUDENT IN AN ORGANIZED HEALTH CARE EDUCATION/TRAINING PROGRAM

## 2024-08-07 PROCEDURE — 1126F AMNT PAIN NOTED NONE PRSNT: CPT | Performed by: STUDENT IN AN ORGANIZED HEALTH CARE EDUCATION/TRAINING PROGRAM

## 2024-08-07 RX ORDER — METFORMIN HYDROCHLORIDE 1000 MG/1
1000 TABLET ORAL
Qty: 180 TABLET | Refills: 1 | Status: SHIPPED | OUTPATIENT
Start: 2024-08-07

## 2024-08-07 RX ORDER — INSULIN GLARGINE 100 [IU]/ML
INJECTION, SOLUTION SUBCUTANEOUS
Qty: 15 ML | Refills: 1 | Status: SHIPPED | OUTPATIENT
Start: 2024-08-07

## 2024-08-07 ASSESSMENT — PAIN SCALES - GENERAL: PAINLEVEL: 0-NO PAIN

## 2024-08-07 NOTE — PATIENT INSTRUCTIONS
Increase Lantus to 15 units at bedtime  Continue Trulicity as you are  Continue glipizide and metformin  Please complete the [facility] patient assistance form before next visit. Bring a copy of your proof of income with you along with the forms to your next visit.  Please have blood and urine tests done a few days before your next visit   Enbrel Counseling:  I discussed with the patient the risks of etanercept including but not limited to myelosuppression, immunosuppression, autoimmune hepatitis, demyelinating diseases, lymphoma, and infections.  The patient understands that monitoring is required including a PPD at baseline and must alert us or the primary physician if symptoms of infection or other concerning signs are noted.

## 2024-08-09 ENCOUNTER — APPOINTMENT (OUTPATIENT)
Dept: GASTROENTEROLOGY | Facility: CLINIC | Age: 79
End: 2024-08-09
Payer: MEDICARE

## 2024-08-30 DIAGNOSIS — I10 PRIMARY HYPERTENSION: ICD-10-CM

## 2024-09-12 ENCOUNTER — CLINICAL SUPPORT (OUTPATIENT)
Dept: PRIMARY CARE | Facility: CLINIC | Age: 79
End: 2024-09-12
Payer: MEDICARE

## 2024-09-12 DIAGNOSIS — Z23 ENCOUNTER FOR ADMINISTRATION OF VACCINE: Primary | ICD-10-CM

## 2024-09-12 PROCEDURE — G0008 ADMIN INFLUENZA VIRUS VAC: HCPCS | Performed by: INTERNAL MEDICINE

## 2024-09-12 PROCEDURE — 90662 IIV NO PRSV INCREASED AG IM: CPT | Performed by: INTERNAL MEDICINE

## 2024-09-13 RX ORDER — AMLODIPINE BESYLATE 10 MG/1
10 TABLET ORAL DAILY
Qty: 90 TABLET | Refills: 0 | Status: SHIPPED | OUTPATIENT
Start: 2024-09-13

## 2024-09-18 DIAGNOSIS — H40.001 GLAUCOMA SUSPECT OF RIGHT EYE: ICD-10-CM

## 2024-09-18 DIAGNOSIS — H40.002 GLAUCOMA SUSPECT OF LEFT EYE: ICD-10-CM

## 2024-09-18 DIAGNOSIS — H40.053 BILATERAL OCULAR HYPERTENSION: ICD-10-CM

## 2024-09-19 RX ORDER — LATANOPROST 50 UG/ML
SOLUTION/ DROPS OPHTHALMIC
Qty: 9 ML | Refills: 0 | Status: SHIPPED | OUTPATIENT
Start: 2024-09-19

## 2024-09-23 ENCOUNTER — APPOINTMENT (OUTPATIENT)
Dept: GASTROENTEROLOGY | Facility: CLINIC | Age: 79
End: 2024-09-23
Payer: MEDICARE

## 2024-09-23 VITALS — BODY MASS INDEX: 31.1 KG/M2 | HEIGHT: 62 IN | HEART RATE: 82 BPM | WEIGHT: 169 LBS | OXYGEN SATURATION: 98 %

## 2024-09-23 DIAGNOSIS — K31.84 GASTROPARESIS: Primary | ICD-10-CM

## 2024-09-23 DIAGNOSIS — K59.01 SLOW TRANSIT CONSTIPATION: ICD-10-CM

## 2024-09-23 DIAGNOSIS — K21.9 GASTROESOPHAGEAL REFLUX DISEASE WITHOUT ESOPHAGITIS: ICD-10-CM

## 2024-09-23 DIAGNOSIS — R10.13 ABDOMINAL PAIN, EPIGASTRIC: ICD-10-CM

## 2024-09-23 DIAGNOSIS — Z79.4 TYPE 2 DIABETES MELLITUS WITH OTHER SPECIFIED COMPLICATION, WITH LONG-TERM CURRENT USE OF INSULIN: ICD-10-CM

## 2024-09-23 DIAGNOSIS — E11.69 TYPE 2 DIABETES MELLITUS WITH OTHER SPECIFIED COMPLICATION, WITH LONG-TERM CURRENT USE OF INSULIN: ICD-10-CM

## 2024-09-23 PROCEDURE — 1159F MED LIST DOCD IN RCRD: CPT | Performed by: INTERNAL MEDICINE

## 2024-09-23 PROCEDURE — 1036F TOBACCO NON-USER: CPT | Performed by: INTERNAL MEDICINE

## 2024-09-23 PROCEDURE — 1160F RVW MEDS BY RX/DR IN RCRD: CPT | Performed by: INTERNAL MEDICINE

## 2024-09-23 PROCEDURE — 99215 OFFICE O/P EST HI 40 MIN: CPT | Performed by: INTERNAL MEDICINE

## 2024-09-23 RX ORDER — ESOMEPRAZOLE MAGNESIUM 40 MG/1
40 CAPSULE, DELAYED RELEASE ORAL
Qty: 180 CAPSULE | Refills: 1 | Status: SHIPPED | OUTPATIENT
Start: 2024-09-23 | End: 2025-09-23

## 2024-09-23 NOTE — PATIENT INSTRUCTIONS
"Eat 4 to 5 small meals during the day, instead of 2 or 3 big ones.  Put food in the  before eating it.  Cut down on foods that have a lot of fat, such as cheese and fried foods.  Cut down on foods that have a lot of \"insoluble\" fiber, such as some fruits, vegetables, and beans.  Avoid fizzy drinks, like soda. They can cause more bloating and gas.  Avoid alcohol and smoking.  If you have diabetes, it's also very important to keep your blood sugar as close to normal as possible.   Take Miralax everyday  "

## 2024-09-23 NOTE — LETTER
September 23, 2024     Juan C Rodríguez DO  [address]  [facility] [address]    Patient: Nii Colunga   YOB: 1945   Date of Visit: 9/23/2024       Dear Dr. Juan C Rodríguez DO:    Thank you for referring Nii Colunga to me for evaluation. Below are my notes for this consultation.  If you have questions, please do not hesitate to call me. I look forward to following your patient along with you.       Sincerely,     Brock Reynaga MD, MS      CC: Owen Izquierdo MD  ______________________________________________________________________________________    Primary Care Provider: Owen Izquierdo MD  Referring Provider: Juan C Rodríguez DO  My final recommendations will be communicated back to the referring physician and/or primary care provider via shared medical records or via US mail.    Chief Complaint (Reason for visit):   Nii Colunga is a 79 y.o. female who presents for heartburn and early satiety    ASSESSMENT AND PLAN     Assessment & Plan  Gastroparesis  Patient's symptoms are classic for gastroparesis.  She could have autonomic neuropathy secondary to longstanding diabetes.  Superimposed use of Trulicity has been likely contributed to the patient's symptoms as well    -In detail and in layman language, I discussed the pathophysiology of gastroparesis and how Trulicity is designed to work.  I also explained to the patient that some of the effects are exactly how and the GLP-1 analog is designed to work.  I talked to her about the gastroparesis diet as well.  -If her symptoms become untolerable, I will encourage her to discuss with her PCP, Owen Izquierdo MD  about stopping Trulicity  -I will also discussed the risks benefits and alternatives of performing an endoscopy.  We will hold off for now  -I will switch her to Nexium twice a day to help her with her acid reflux symptoms    Orders:  •  esomeprazole (NexIUM) 40 mg DR capsule; Take 1 capsule (40 mg) by mouth 2 times a day before  meals. Take the medicine 30-45 minute before meals. Do not open capsule.  •  Follow Up In Gastroenterology; Future    Gastroesophageal reflux disease without esophagitis  I will switch her to Nexium twice a day and assess response  Orders:  •  esomeprazole (NexIUM) 40 mg DR capsule; Take 1 capsule (40 mg) by mouth 2 times a day before meals. Take the medicine 30-45 minute before meals. Do not open capsule.  •  Follow Up In Gastroenterology; Future    Type 2 diabetes mellitus with other specified complication, with long-term current use of insulin    Orders:  •  esomeprazole (NexIUM) 40 mg DR capsule; Take 1 capsule (40 mg) by mouth 2 times a day before meals. Take the medicine 30-45 minute before meals. Do not open capsule.  •  Follow Up In Gastroenterology; Future    Slow transit constipation  She likely has slow transit constipation against secondary to autonomic neuropathy from diabetes as well as contribution from Trulicity    6/2024-colonoscopy-good prep, no polyps    -Will ask her to start taking MiraLAX every day and assess response         The total time spent on rendering services for this patient (obtaining/reviewing additional history, performing medically appropriate exam and/or evaluation, reviewing the electronic chart, labs, imaging, independently interpreting the results and communicating and discussing with medical teams, counseling and educating the patient, discussing with friend (present in room and authorized by patient), ordering medications, tests and/or procedures, coordinating care, and documentation) was 45 minutes.       Brock Reynaga MD, MS  9/23/2024    SUBJECTIVE   HPI: History obtained from patient    79-year-old female who comes to see me for acid reflux    She endorses heartburn and bitter taste to her mouth.   She already is on pantoprazole once a day    RF  + Long-term diabetes on insulin  + Use of Trulicity    B) she also mentions constipation.  She moves her bowels about 4 times  a week, Dade scale 1-3  She has a feeling of incomplete evacuation as well    REDFLAGS -she has lost weight on Trulicity  Pt denies any blood in stool, black stools  Pt denies fever, chills, night sweats  Pt denies family history of GI cancer    Past Medical History:   Diagnosis Date   • Disorder of thyroid, unspecified 05/25/2016    Thyroid mass   • Dry eye syndrome of bilateral lacrimal glands 09/13/2019    Bilateral dry eyes   • Dry eye syndrome of bilateral lacrimal glands 09/13/2019    Bilateral dry eyes   • Dry eye syndrome of bilateral lacrimal glands 09/13/2019    Bilateral dry eyes   • Essential (primary) hypertension 08/23/2022    Hypertension   • Personal history of malignant neoplasm of other parts of uterus 01/13/2016    History of cancer of uterus   • Personal history of other endocrine, nutritional and metabolic disease     History of diabetes mellitus   • Preglaucoma, unspecified, 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    Glaucoma suspect of both eyes   • Preglaucoma, unspecified, 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    Glaucoma suspect of both eyes   • Preglaucoma, unspecified, 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    Glaucoma suspect of both eyes   • Ulcerative blepharitis unspecified eye, 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/13/2019    Blepharitis, ulcerative   • Unspecified asthma, uncomplicated ([facility]) 08/25/2016    Asthma   • Vitreous degeneration, 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    Bilateral vitreous detachment   • Vitreous degeneration, 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    Bilateral vitreous detachment   • Vitreous degeneration, 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    Bilateral vitreous detachment       Past Surgical History:   Procedure Laterality Date   • HYSTERECTOMY  02/24/2016    Hysterectomy   • THYROID SURGERY  04/05/2016    Thyroid Surgery   • TUBAL LIGATION  01/13/2016    Tubal Ligation       Current Outpatient Medications   Medication Sig Dispense Refill   • acetaminophen (Tylenol Extra Strength) 500 mg tablet TAKE 1 TABLET EVERY 4 TO 6 HOURS AS NEEDED.     • albuterol (ProAir HFA)  "90 mcg/actuation inhaler Inhale 2 puffs every 4 hours if needed for shortness of breath. 18 g 11   • amLODIPine (Norvasc) 10 mg tablet Take 1 tablet by mouth once daily 90 tablet 0   • aspirin 81 mg EC tablet Take 1 tablet (81 mg) by mouth once daily.     • blood sugar diagnostic strip Use to check blood sugar 3 times per day 300 each 0   • cyclobenzaprine (Flexeril) 10 mg tablet Take 1 tablet (10 mg) by mouth once daily at bedtime.     • dulaglutide (Trulicity) 4.5 mg/0.5 mL pen injector Inject 4.5 mg under the skin 1 (one) time per week. 6 mL 3   • glipiZIDE (Glucotrol) 10 mg tablet Take 1 tablet (10 mg) by mouth 2 times a day before meals. 180 tablet 1   • lancets (OneTouch Delica Lancets) 33 gauge misc 1 each 3 times a day.     • Lantus Solostar U-100 Insulin 100 unit/mL (3 mL) pen Inject 15 units at bedtime 15 mL 1   • latanoprost (Xalatan) 0.005 % ophthalmic solution INSTILL 1 DROP INTO EACH EYE AT BEDTIME 9 mL 0   • lisinopriL-hydrochlorothiazide 20-25 mg tablet Take 1 tablet by mouth once daily. 90 tablet 1   • metFORMIN (Glucophage) 1,000 mg tablet Take 1 tablet (1,000 mg) by mouth 2 times daily (morning and late afternoon). 180 tablet 1   • metoprolol succinate XL (Toprol-XL) 50 mg 24 hr tablet Take 1 tablet (50 mg) by mouth once daily. Do not crush or chew. 90 tablet 1   • multivitamin tablet Take 1 tablet by mouth once daily.     • pantoprazole (ProtoNix) 40 mg EC tablet Take 1 tablet (40 mg) by mouth once daily. Do not crush, chew, or split. 90 tablet 1   • pen needle, diabetic (BD Ultra-Fine Terri Pen Needle) 32 gauge x 5/32\" needle One once daily with insulin injection. 100 each 3   • polyethylene glycol-electrolytes 420 gram solution STARTING AT 6PM DRINK UNTIL YOU GET TO HALF OF THE BOTTLE.  DRINK THE OTHER HALF 5HRS BEFORE ARRIVAL TIME. 4000 mL 0   • simvastatin (Zocor) 20 mg tablet Take 1 tablet by mouth once daily 90 tablet 0     No current facility-administered medications for this visit. " "      No Known Allergies  OBJECTIVE   PHYSICAL EXAM:  Pulse 82   Ht 1.575 m (5' 2\")   Wt 76.7 kg (169 lb)   SpO2 98%   BMI 30.91 kg/m²      LABS/IMAGING/SCOPES  Lab Results   Component Value Date    WBC 9.0 07/25/2024    HGB 13.4 07/25/2024    HCT 41.1 07/25/2024    MCV 86 07/25/2024     07/25/2024     Lab Results   Component Value Date    GLUCOSE 120 (H) 07/25/2024    CALCIUM 10.0 07/25/2024     07/25/2024    K 4.1 07/25/2024    CO2 25 07/25/2024     07/25/2024    BUN 20 07/25/2024    CREATININE 1.26 (H) 07/25/2024     Lab Results   Component Value Date    ALT 14 07/25/2024    AST 17 07/25/2024    ALKPHOS 61 07/25/2024    BILITOT 0.3 07/25/2024       === 07/25/24 ===    CT ABDOMEN PELVIS ANGIOGRAM W AND/OR WO IV CONTRAST    - Impression -  1. No evidence of active gastrointestinal hemorrhage. If ongoing  concern for GI bleed then a nuclear medicine GI bleeding scan may be  performed in further assessment.  2. No acute process within the abdomen or pelvis.  3. Hepatic steatosis.            Signed by: Alcon Booth 7/25/2024 7:38 PM  Dictation workstation:   [id number]    Brock Reynaga MD, MS  9/23/2024  "

## 2024-09-23 NOTE — ASSESSMENT & PLAN NOTE
I will switch her to Nexium twice a day and assess response  Orders:    esomeprazole (NexIUM) 40 mg DR capsule; Take 1 capsule (40 mg) by mouth 2 times a day before meals. Take the medicine 30-45 minute before meals. Do not open capsule.    Follow Up In Gastroenterology; Future

## 2024-09-23 NOTE — ASSESSMENT & PLAN NOTE
Orders:    esomeprazole (NexIUM) 40 mg DR capsule; Take 1 capsule (40 mg) by mouth 2 times a day before meals. Take the medicine 30-45 minute before meals. Do not open capsule.    Follow Up In Gastroenterology; Future

## 2024-09-23 NOTE — PROGRESS NOTES
Primary Care Provider: Owen Izquierdo MD  Referring Provider: Juan C Rodríguez DO  My final recommendations will be communicated back to the referring physician and/or primary care provider via shared medical records or via US mail.    Chief Complaint (Reason for visit):   Nii Colunga is a 79 y.o. female who presents for heartburn and early satiety    ASSESSMENT AND PLAN     Assessment & Plan  Gastroparesis  Patient's symptoms are classic for gastroparesis.  She could have autonomic neuropathy secondary to longstanding diabetes.  Superimposed use of Trulicity has been likely contributed to the patient's symptoms as well    -In detail and in layman language, I discussed the pathophysiology of gastroparesis and how Trulicity is designed to work.  I also explained to the patient that some of the effects are exactly how and the GLP-1 analog is designed to work.  I talked to her about the gastroparesis diet as well.  -If her symptoms become untolerable, I will encourage her to discuss with her PCP, Owen Izquierdo MD  about stopping Trulicity  -I will also discussed the risks benefits and alternatives of performing an endoscopy.  We will hold off for now  -I will switch her to Nexium twice a day to help her with her acid reflux symptoms    Orders:    esomeprazole (NexIUM) 40 mg DR capsule; Take 1 capsule (40 mg) by mouth 2 times a day before meals. Take the medicine 30-45 minute before meals. Do not open capsule.    Follow Up In Gastroenterology; Future    Gastroesophageal reflux disease without esophagitis  I will switch her to Nexium twice a day and assess response  Orders:    esomeprazole (NexIUM) 40 mg DR capsule; Take 1 capsule (40 mg) by mouth 2 times a day before meals. Take the medicine 30-45 minute before meals. Do not open capsule.    Follow Up In Gastroenterology; Future    Type 2 diabetes mellitus with other specified complication, with long-term current use of insulin    Orders:    esomeprazole  (NexIUM) 40 mg DR capsule; Take 1 capsule (40 mg) by mouth 2 times a day before meals. Take the medicine 30-45 minute before meals. Do not open capsule.    Follow Up In Gastroenterology; Future    Slow transit constipation  She likely has slow transit constipation against secondary to autonomic neuropathy from diabetes as well as contribution from Trulicity    6/2024-colonoscopy-good prep, no polyps    -Will ask her to start taking MiraLAX every day and assess response         The total time spent on rendering services for this patient (obtaining/reviewing additional history, performing medically appropriate exam and/or evaluation, reviewing the electronic chart, labs, imaging, independently interpreting the results and communicating and discussing with medical teams, counseling and educating the patient, discussing with friend (present in room and authorized by patient), ordering medications, tests and/or procedures, coordinating care, and documentation) was 45 minutes.       Brock Reynaga MD, MS  9/23/2024    SUBJECTIVE   HPI: History obtained from patient    79-year-old female who comes to see me for acid reflux    She endorses heartburn and bitter taste to her mouth.   She already is on pantoprazole once a day    RF  + Long-term diabetes on insulin  + Use of Trulicity    B) she also mentions constipation.  She moves her bowels about 4 times a week, [place] scale 1-3  She has a feeling of incomplete evacuation as well    REDFLAGS -she has lost weight on Trulicity  Pt denies any blood in stool, black stools  Pt denies fever, chills, night sweats  Pt denies family history of GI cancer    Past Medical History:   Diagnosis Date    Disorder of thyroid, unspecified 05/25/2016    Thyroid mass    Dry eye syndrome of bilateral lacrimal glands 09/13/2019    Bilateral dry eyes    Dry eye syndrome of bilateral lacrimal glands 09/13/2019    Bilateral dry eyes    Dry eye syndrome of bilateral lacrimal glands 09/13/2019     Bilateral dry eyes    Essential (primary) hypertension 08/23/2022    Hypertension    Personal history of malignant neoplasm of other parts of uterus 01/13/2016    History of cancer of uterus    Personal history of other endocrine, nutritional and metabolic disease     History of diabetes mellitus    Preglaucoma, unspecified, 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    Glaucoma suspect of both eyes    Preglaucoma, unspecified, 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    Glaucoma suspect of both eyes    Preglaucoma, unspecified, 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    Glaucoma suspect of both eyes    Ulcerative blepharitis unspecified eye, 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/13/2019    Blepharitis, ulcerative    Unspecified asthma, uncomplicated ([facility]) 08/25/2016    Asthma    Vitreous degeneration, 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    Bilateral vitreous detachment    Vitreous degeneration, 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    Bilateral vitreous detachment    Vitreous degeneration, 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    Bilateral vitreous detachment       Past Surgical History:   Procedure Laterality Date    HYSTERECTOMY  02/24/2016    Hysterectomy    THYROID SURGERY  04/05/2016    Thyroid Surgery    TUBAL LIGATION  01/13/2016    Tubal Ligation       Current Outpatient Medications   Medication Sig Dispense Refill    acetaminophen (Tylenol Extra Strength) 500 mg tablet TAKE 1 TABLET EVERY 4 TO 6 HOURS AS NEEDED.      albuterol (ProAir HFA) 90 mcg/actuation inhaler Inhale 2 puffs every 4 hours if needed for shortness of breath. 18 g 11    amLODIPine (Norvasc) 10 mg tablet Take 1 tablet by mouth once daily 90 tablet 0    aspirin 81 mg EC tablet Take 1 tablet (81 mg) by mouth once daily.      blood sugar diagnostic strip Use to check blood sugar 3 times per day 300 each 0    cyclobenzaprine (Flexeril) 10 mg tablet Take 1 tablet (10 mg) by mouth once daily at bedtime.      dulaglutide (Trulicity) 4.5 mg/0.5 mL pen injector Inject 4.5 mg under the skin 1 (one) time per week. 6 mL 3    glipiZIDE (Glucotrol) 10 mg tablet Take 1  "tablet (10 mg) by mouth 2 times a day before meals. 180 tablet 1    lancets (OneTouch Delica Lancets) 33 gauge misc 1 each 3 times a day.      Lantus Solostar U-100 Insulin 100 unit/mL (3 mL) pen Inject 15 units at bedtime 15 mL 1    latanoprost (Xalatan) 0.005 % ophthalmic solution INSTILL 1 DROP INTO EACH EYE AT BEDTIME 9 mL 0    lisinopriL-hydrochlorothiazide 20-25 mg tablet Take 1 tablet by mouth once daily. 90 tablet 1    metFORMIN (Glucophage) 1,000 mg tablet Take 1 tablet (1,000 mg) by mouth 2 times daily (morning and late afternoon). 180 tablet 1    metoprolol succinate XL (Toprol-XL) 50 mg 24 hr tablet Take 1 tablet (50 mg) by mouth once daily. Do not crush or chew. 90 tablet 1    multivitamin tablet Take 1 tablet by mouth once daily.      pantoprazole (ProtoNix) 40 mg EC tablet Take 1 tablet (40 mg) by mouth once daily. Do not crush, chew, or split. 90 tablet 1    pen needle, diabetic (BD Ultra-Fine Terri Pen Needle) 32 gauge x 5/32\" needle One once daily with insulin injection. 100 each 3    polyethylene glycol-electrolytes 420 gram solution STARTING AT 6PM DRINK UNTIL YOU GET TO HALF OF THE BOTTLE.  DRINK THE OTHER HALF 5HRS BEFORE ARRIVAL TIME. 4000 mL 0    simvastatin (Zocor) 20 mg tablet Take 1 tablet by mouth once daily 90 tablet 0     No current facility-administered medications for this visit.       No Known Allergies  OBJECTIVE   PHYSICAL EXAM:  Pulse 82   Ht 1.575 m (5' 2\")   Wt 76.7 kg (169 lb)   SpO2 98%   BMI 30.91 kg/m²      LABS/IMAGING/SCOPES  Lab Results   Component Value Date    WBC 9.0 07/25/2024    HGB 13.4 07/25/2024    HCT 41.1 07/25/2024    MCV 86 07/25/2024     07/25/2024     Lab Results   Component Value Date    GLUCOSE 120 (H) 07/25/2024    CALCIUM 10.0 07/25/2024     07/25/2024    K 4.1 07/25/2024    CO2 25 07/25/2024     07/25/2024    BUN 20 07/25/2024    CREATININE 1.26 (H) 07/25/2024     Lab Results   Component Value Date    ALT 14 07/25/2024    AST 17 " 07/25/2024    ALKPHOS 61 07/25/2024    BILITOT 0.3 07/25/2024       === 07/25/24 ===    CT ABDOMEN PELVIS ANGIOGRAM W AND/OR WO IV CONTRAST    - Impression -  1. No evidence of active gastrointestinal hemorrhage. If ongoing  concern for GI bleed then a nuclear medicine GI bleeding scan may be  performed in further assessment.  2. No acute process within the abdomen or pelvis.  3. Hepatic steatosis.            Signed by: Alcon Booth 7/25/2024 7:38 PM  Dictation workstation:   [id number]    Brock Reynaga MD, MS  9/23/2024

## 2024-10-07 ENCOUNTER — APPOINTMENT (OUTPATIENT)
Dept: CARDIOLOGY | Facility: HOSPITAL | Age: 79
End: 2024-10-07
Payer: MEDICARE

## 2024-10-07 ENCOUNTER — APPOINTMENT (OUTPATIENT)
Dept: RADIOLOGY | Facility: HOSPITAL | Age: 79
End: 2024-10-07
Payer: MEDICARE

## 2024-10-07 ENCOUNTER — HOSPITAL ENCOUNTER (EMERGENCY)
Facility: HOSPITAL | Age: 79
Discharge: HOME | End: 2024-10-07
Attending: EMERGENCY MEDICINE
Payer: MEDICARE

## 2024-10-07 VITALS
HEART RATE: 82 BPM | WEIGHT: 168 LBS | TEMPERATURE: 98.4 F | BODY MASS INDEX: 30.91 KG/M2 | HEIGHT: 62 IN | SYSTOLIC BLOOD PRESSURE: 133 MMHG | DIASTOLIC BLOOD PRESSURE: 81 MMHG | RESPIRATION RATE: 18 BRPM | OXYGEN SATURATION: 97 %

## 2024-10-07 DIAGNOSIS — H93.19 UNILATERAL SUBJECTIVE NONPULSATILE TINNITUS WITHOUT HEARING LOSS, OTOSCOPIC FINDING, NEUROLOGIC DEFICIT, OR HEAD TRAUMA: Primary | ICD-10-CM

## 2024-10-07 LAB
ALBUMIN SERPL BCP-MCNC: 4.9 G/DL (ref 3.4–5)
ALP SERPL-CCNC: 61 U/L (ref 33–136)
ALT SERPL W P-5'-P-CCNC: 14 U/L (ref 7–45)
ANION GAP SERPL CALC-SCNC: 13 MMOL/L (ref 10–20)
APTT PPP: 33 SECONDS (ref 27–38)
AST SERPL W P-5'-P-CCNC: 17 U/L (ref 9–39)
ATRIAL RATE: 104 BPM
BASOPHILS # BLD AUTO: 0.07 X10*3/UL (ref 0–0.1)
BASOPHILS NFR BLD AUTO: 0.9 %
BILIRUB SERPL-MCNC: 0.3 MG/DL (ref 0–1.2)
BUN SERPL-MCNC: 23 MG/DL (ref 6–23)
CALCIUM SERPL-MCNC: 10.2 MG/DL (ref 8.6–10.3)
CHLORIDE SERPL-SCNC: 100 MMOL/L (ref 98–107)
CO2 SERPL-SCNC: 29 MMOL/L (ref 21–32)
CREAT SERPL-MCNC: 1.33 MG/DL (ref 0.5–1.05)
EGFRCR SERPLBLD CKD-EPI 2021: 41 ML/MIN/1.73M*2
EOSINOPHIL # BLD AUTO: 0.19 X10*3/UL (ref 0–0.4)
EOSINOPHIL NFR BLD AUTO: 2.4 %
ERYTHROCYTE [DISTWIDTH] IN BLOOD BY AUTOMATED COUNT: 14.4 % (ref 11.5–14.5)
ERYTHROCYTE [SEDIMENTATION RATE] IN BLOOD BY WESTERGREN METHOD: 12 MM/H (ref 0–30)
GLUCOSE SERPL-MCNC: 137 MG/DL (ref 74–99)
HCT VFR BLD AUTO: 42.7 % (ref 36–46)
HGB BLD-MCNC: 13.5 G/DL (ref 12–16)
IMM GRANULOCYTES # BLD AUTO: 0.03 X10*3/UL (ref 0–0.5)
IMM GRANULOCYTES NFR BLD AUTO: 0.4 % (ref 0–0.9)
INR PPP: 0.9 (ref 0.9–1.1)
LYMPHOCYTES # BLD AUTO: 2.19 X10*3/UL (ref 0.8–3)
LYMPHOCYTES NFR BLD AUTO: 28 %
MAGNESIUM SERPL-MCNC: 1.7 MG/DL (ref 1.6–2.4)
MCH RBC QN AUTO: 27.2 PG (ref 26–34)
MCHC RBC AUTO-ENTMCNC: 31.6 G/DL (ref 32–36)
MCV RBC AUTO: 86 FL (ref 80–100)
MONOCYTES # BLD AUTO: 0.41 X10*3/UL (ref 0.05–0.8)
MONOCYTES NFR BLD AUTO: 5.2 %
NEUTROPHILS # BLD AUTO: 4.94 X10*3/UL (ref 1.6–5.5)
NEUTROPHILS NFR BLD AUTO: 63.1 %
NRBC BLD-RTO: 0 /100 WBCS (ref 0–0)
P AXIS: 65 DEGREES
P OFFSET: 204 MS
P ONSET: 147 MS
PLATELET # BLD AUTO: 336 X10*3/UL (ref 150–450)
POTASSIUM SERPL-SCNC: 4.2 MMOL/L (ref 3.5–5.3)
PR INTERVAL: 160 MS
PROT SERPL-MCNC: 7.8 G/DL (ref 6.4–8.2)
PROTHROMBIN TIME: 10 SECONDS (ref 9.8–12.8)
Q ONSET: 227 MS
QRS COUNT: 17 BEATS
QRS DURATION: 74 MS
QT INTERVAL: 340 MS
QTC CALCULATION(BAZETT): 447 MS
QTC FREDERICIA: 408 MS
R AXIS: 52 DEGREES
RBC # BLD AUTO: 4.97 X10*6/UL (ref 4–5.2)
SODIUM SERPL-SCNC: 138 MMOL/L (ref 136–145)
T AXIS: 41 DEGREES
T OFFSET: 397 MS
VENTRICULAR RATE: 104 BPM
WBC # BLD AUTO: 7.8 X10*3/UL (ref 4.4–11.3)

## 2024-10-07 PROCEDURE — 2550000001 HC RX 255 CONTRASTS: Performed by: PHYSICIAN ASSISTANT

## 2024-10-07 PROCEDURE — 36415 COLL VENOUS BLD VENIPUNCTURE: CPT | Performed by: PHYSICIAN ASSISTANT

## 2024-10-07 PROCEDURE — 85652 RBC SED RATE AUTOMATED: CPT | Performed by: PHYSICIAN ASSISTANT

## 2024-10-07 PROCEDURE — 99285 EMERGENCY DEPT VISIT HI MDM: CPT | Mod: 25

## 2024-10-07 PROCEDURE — 80053 COMPREHEN METABOLIC PANEL: CPT | Performed by: PHYSICIAN ASSISTANT

## 2024-10-07 PROCEDURE — 93005 ELECTROCARDIOGRAM TRACING: CPT

## 2024-10-07 PROCEDURE — 70498 CT ANGIOGRAPHY NECK: CPT | Performed by: RADIOLOGY

## 2024-10-07 PROCEDURE — 96375 TX/PRO/DX INJ NEW DRUG ADDON: CPT | Mod: 59

## 2024-10-07 PROCEDURE — 2500000004 HC RX 250 GENERAL PHARMACY W/ HCPCS (ALT 636 FOR OP/ED): Performed by: PHYSICIAN ASSISTANT

## 2024-10-07 PROCEDURE — 70496 CT ANGIOGRAPHY HEAD: CPT | Performed by: RADIOLOGY

## 2024-10-07 PROCEDURE — 83735 ASSAY OF MAGNESIUM: CPT | Performed by: PHYSICIAN ASSISTANT

## 2024-10-07 PROCEDURE — 85025 COMPLETE CBC W/AUTO DIFF WBC: CPT | Performed by: PHYSICIAN ASSISTANT

## 2024-10-07 PROCEDURE — 85730 THROMBOPLASTIN TIME PARTIAL: CPT | Performed by: PHYSICIAN ASSISTANT

## 2024-10-07 PROCEDURE — 70498 CT ANGIOGRAPHY NECK: CPT

## 2024-10-07 PROCEDURE — 85610 PROTHROMBIN TIME: CPT | Performed by: PHYSICIAN ASSISTANT

## 2024-10-07 PROCEDURE — 96374 THER/PROPH/DIAG INJ IV PUSH: CPT | Mod: 59

## 2024-10-07 RX ORDER — DIPHENHYDRAMINE HYDROCHLORIDE 50 MG/ML
12.5 INJECTION INTRAMUSCULAR; INTRAVENOUS ONCE
Status: COMPLETED | OUTPATIENT
Start: 2024-10-07 | End: 2024-10-07

## 2024-10-07 ASSESSMENT — PAIN - FUNCTIONAL ASSESSMENT: PAIN_FUNCTIONAL_ASSESSMENT: 0-10

## 2024-10-07 ASSESSMENT — PAIN SCALES - GENERAL
PAINLEVEL_OUTOF10: 0 - NO PAIN
PAINLEVEL_OUTOF10: 0 - NO PAIN

## 2024-10-07 NOTE — ED PROVIDER NOTES
"Chief Complaint   Patient presents with    Tinnitus     HPI:   Nii Colunga is an 79 y.o. female complicated PMH including HTN, HLD, GERD, asthma, glaucoma, T2DM w/CKD, endometrial cancer, thyroid dysfunction presents to the ED with  for evaluation of acute tinnitus that woke her from sleep at 0600.  She localizes it to her right ear only.  Says it sounds like a constant \"buzzing, like if you are TV was on the wrong channel\".  Says that she is hearing an echo and states \"when you talk I can hear you twice in that ear\".  Endorses associated headache and right-sided neck pain that starts at the base of her right ear and travels in a straight line down to her clavicle.  Never had anything like this happen before.  Denies pulsatile tinnitus, denies ringing in the ear, denies otorrhea.  Denies vision changes, speech changes, muscle weakness.  Denies history of MS or autoimmune deficiency.  Does note she started esomeprazole 4 days ago.  Currently pain 2/10.  Denies any recent trauma or injury to the head or neck.  Endorses history of atherosclerosis but denies history of CVA, CAD, MI.  Denies any chest pain, palpitations, shortness of breath, fever, chills, sore throat.    Medications:  Soc HX:  No Known Allergies:  Past Medical History:   Diagnosis Date    Disorder of thyroid, unspecified 05/25/2016    Thyroid mass    Dry eye syndrome of bilateral lacrimal glands 09/13/2019    Bilateral dry eyes    Dry eye syndrome of bilateral lacrimal glands 09/13/2019    Bilateral dry eyes    Dry eye syndrome of bilateral lacrimal glands 09/13/2019    Bilateral dry eyes    Essential (primary) hypertension 08/23/2022    Hypertension    Personal history of malignant neoplasm of other parts of uterus 01/13/2016    History of cancer of uterus    Personal history of other endocrine, nutritional and metabolic disease     History of diabetes mellitus    Preglaucoma, unspecified, 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    Glaucoma suspect of both eyes "    Preglaucoma, unspecified, 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    Glaucoma suspect of both eyes    Preglaucoma, unspecified, 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    Glaucoma suspect of both eyes    Ulcerative blepharitis unspecified eye, 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/13/2019    Blepharitis, ulcerative    Unspecified asthma, uncomplicated ([facility]) 08/25/2016    Asthma    Vitreous degeneration, 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    Bilateral vitreous detachment    Vitreous degeneration, 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    Bilateral vitreous detachment    Vitreous degeneration, 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    Bilateral vitreous detachment     Past Surgical History:   Procedure Laterality Date    HYSTERECTOMY  02/24/2016    Hysterectomy    THYROID SURGERY  04/05/2016    Thyroid Surgery    TUBAL LIGATION  01/13/2016    Tubal Ligation     Family History   Problem Relation Name Age of Onset    Hypertension Mother      Other (Malignant Neoplasm) Mother      Ovarian cancer Mother      Hypertension Father      Diabetes Father      Other (Malignant Neoplasm) Father      Hypertension Sister      Other (Cardiac Disorder) Sister      Other (Malignant Neoplasm) Sister      Hypertension Brother      Diabetes Brother      Other (Malignant Neoplasm Of Breast) Sibling        Physical Exam  Vitals and nursing note reviewed.   Constitutional:       General: She is not in acute distress.     Appearance: Normal appearance. She is not ill-appearing or toxic-appearing.   HENT:      Right Ear: Tympanic membrane, ear canal and external ear normal.      Left Ear: Tympanic membrane, ear canal and external ear normal.      Nose: Nose normal.      Mouth/Throat:      Mouth: Mucous membranes are moist.      Pharynx: No posterior oropharyngeal erythema.   Eyes:      Extraocular Movements: Extraocular movements intact.      Pupils: Pupils are equal, round, and reactive to light.      Comments: Bidirectional nystagmus   Neck:        Comments: No carotid bruit nor JVD  Cardiovascular:      Rate and Rhythm: Normal rate  and regular rhythm.      Pulses: Normal pulses.      Heart sounds: Normal heart sounds.   Pulmonary:      Effort: Pulmonary effort is normal.      Breath sounds: Normal breath sounds.   Musculoskeletal:         General: Normal range of motion.      Cervical back: Normal range of motion. Tenderness (Right sided tenderness to palpation.  No midline tenderness.  See highlighted area on diagram) present.      Comments: Symmetric strength and sensation bilateral upper and lower extremities   Lymphadenopathy:      Cervical: No cervical adenopathy.   Skin:     General: Skin is warm and dry.   Neurological:      General: No focal deficit present.      Mental Status: She is alert and oriented to person, place, and time.      Cranial Nerves: No cranial nerve deficit.     VS: As documented in the triage note and EMR flowsheet from this visit were reviewed.    EKG INTERPRETATION:      Personally Reviewed, significant artifact      Rhythm: Sinus tachycardia,    Rate:   104 bpm     Axis: Normal axis      Intervals:  Normal WI interval 160 ms      QRS Complex:  Normal      ST Segment:  Normal ST-T segments      QT Interval: QTc 447 ms          Medical Decision Making:   ED Course as of 10/07/24 1148   Mon Oct 07, 2024   0754 Vitals Reviewed: Afebrile. Hypertensive. Not tachycardic nor tachypneic. No hypoxia.   [KA]   0803 Patient is a pleasant 79-year-old female who presents to the ED for evaluation of constant buzzing in the right ear with associated headache and neck pain.  On exam bilateral TMs appear normal.  She has no mastoid tenderness/swelling but does have focal tenderness along the right side of the neck.  Normal range of motion of the neck.  She is neurovascular intact but does have bidirectional nystagmus and reports dizziness with EOMI testing.  DDx includes, but is not limited to, side effect of new medication, carotid or vertebral artery dissection or aneurysm, posterior CVA, electrolyte abnormality.  Will obtain  labs, CTA head and neck.  Patient does not want any medication for pain at this time. [KA]   0945 I personally viewed labs.  CBC without significant abnormalities.  Normal hemoglobin and normal white count.  Coags normal.  Magnesium normal.  ESR normal.  CMP shows normal electrolytes, normal liver function.  Creatinine 1.33, was 1.26 two months ago. [KA]   0956 CT tech is messaging me stating that patient GFR is 41 and is concerned about obtaining CT imaging.  She also requested patient be medicated with IV hydration prior to obtaining imaging.  We are currently on national back order of IV fluids and we are under strict instruction not to premedicate people if they can tolerate oral intake.  Will push oral fluids.  I advised CT that risk of missed diagnosis is greater than risk of kidney damage from IV contrast.  CT requested I place clinical note.  I have placed this in chart and improved CT [KA]   1044 Patient returned from CT and she is tremulous.  CT tech said she became tachycardic and hypertensive.  I obtained ECG.  Sinus tachycardia without ST changes.  She has no wheezing, tongue swelling, throat itching.  Has never had this reaction before.  Will give Benadryl and steroid. [KA]   1100 On reassessment, patient feels better.  She is no longer tremulous.  Heart rate is improving. [KA]   1134 IMPRESSION:  CT head:      1. There is no evidence of acute hemorrhage, mass lesion or acute  infarction.  2. Mild burden of chronic microvascular ischemic disease.      CT head and neck:      1. No significant osseous or large branch vessel cutoffs of the major  intracranial or cervical vessels was identified.  2. Multilevel degenerative changes of the cervical spine most  pronounced at C3-C4, C4-C5 and C5-C6.  3. Multiple hypodense right thyroid lesion, recommend correlation  with thyroid ultrasound.  4. Circumferential mural thickening of the proximal intrathoracic  esophagus, recommend correlation with direct  visualization.   [KA]   1144 Discussed lab and imaging with patient and her .  On reassessment, her nystagmus has resolved.  Tinnitus is still present.  Considered admission for MRI; however, with normal workup, resolved symptoms and lack of pain or dizziness feel that she can be managed on close outpatient follow-up.  Placed stat referral to ENT.  Also recommended stopping PPI.  Recommended she follow-up with her primary care doctor within the next few days.  Advised strict return precautions including any weakness, numbness, tingling, dizziness, speech changes she is to return immediately to nearest ED.  She is agreeable. [KA]      ED Course User Index  [KA] Deb Boyd PA-C         Diagnoses as of 10/07/24 1148   Unilateral subjective nonpulsatile tinnitus without hearing loss, otoscopic finding, neurologic deficit, or head trauma      Escalation of Care: Appropriate for outpatient management      Prescription Drug Consideration: On PPI      Discussion of Management with Other Providers:  I discussed the patient/results with: Attending [facility] IR    Counseling: Spoke with the patient and discussed today´s findings, in addition to providing specific details for the plan of care and expected course.  Patient was given the opportunity to ask questions.    Discussed return precautions and importance of follow-up.  Advised to follow-up with PCP and ENT.  Advised to return to the ED for changing or worsening symptoms, new symptoms, complaint specific precautions, and precautions listed on the discharge paperwork.  Educated on the common potential side effects of medications prescribed.    I advised the patient that the emergency evaluation and treatment provided today doesn't end their need for medical care. It is very important that they follow-up with their primary care provider or other specialist as instructed.    The plan of care was mutually agreed upon with the patient. The patient and/or family were  given the opportunity to ask questions. All questions asked today in the ED were answered to the best of my ability with today's information.    I specifically advised the patient to return to the ED for changing or worsening symptoms, worrisome new symptoms, or for any complaint specific precautions listed on the discharge paperwork.    This patient was cared for in the setting of nationwide stress on resources and staffing.    This report was transcribed using voice recognition software.  Every effort was made to ensure accuracy, however, inadvertently computerized transcription errors may be present.       Deb Boyd PA-C  10/07/24 1146

## 2024-10-07 NOTE — ED TRIAGE NOTES
Pt. Arrived to the ED via ambulance via private car for c/o ringing in her right ear that began this morning. Pt. States that she is also hearing at bedtime echo in the right ear as well. Pt. Olivia any pain in the ear

## 2024-10-07 NOTE — DISCHARGE INSTRUCTIONS
Please stop taking Esomeprazole.   Continue to monitor symptoms.  If you develop any dizziness at rest, vision changes, speech changes, numbness or tingling, worsening neck pain or headache please return to nearest ER.  Follow-up with your primary care provider in the next 1 to 2 days.  Stat referral for ENT has been placed.  Please follow-up with ear nose and throat specialist at earliest available appointment.

## 2024-10-07 NOTE — PROGRESS NOTES
This is a clinical event note required by radiology to give permission for patient to receive IV contrast with a GFR of 41.  Radiology is requesting patient to be premedicated with IV fluids.  We are currently on national shortage of IVF and we are under strict instructions not to premedicate people with IVF if they can tolerate p.o. intake.  Patient currently tolerating p.o. intake.  Will push oral hydration.  At this time the clinical risk of missing serious diagnoses that could be debilitating or life-threatening outweighs risk of kidney damage from GFR 41.  Patient to obtain CT with my approval.

## 2024-10-08 ENCOUNTER — OFFICE VISIT (OUTPATIENT)
Dept: PRIMARY CARE | Facility: CLINIC | Age: 79
End: 2024-10-08
Payer: MEDICARE

## 2024-10-08 VITALS
DIASTOLIC BLOOD PRESSURE: 75 MMHG | WEIGHT: 168 LBS | BODY MASS INDEX: 30.91 KG/M2 | HEART RATE: 69 BPM | HEIGHT: 62 IN | SYSTOLIC BLOOD PRESSURE: 126 MMHG | OXYGEN SATURATION: 96 %

## 2024-10-08 DIAGNOSIS — E04.1 THYROID NODULE: ICD-10-CM

## 2024-10-08 DIAGNOSIS — K22.89 ESOPHAGEAL THICKENING: Primary | ICD-10-CM

## 2024-10-08 PROCEDURE — 3078F DIAST BP <80 MM HG: CPT | Performed by: STUDENT IN AN ORGANIZED HEALTH CARE EDUCATION/TRAINING PROGRAM

## 2024-10-08 PROCEDURE — 99213 OFFICE O/P EST LOW 20 MIN: CPT | Performed by: STUDENT IN AN ORGANIZED HEALTH CARE EDUCATION/TRAINING PROGRAM

## 2024-10-08 PROCEDURE — G2211 COMPLEX E/M VISIT ADD ON: HCPCS | Performed by: STUDENT IN AN ORGANIZED HEALTH CARE EDUCATION/TRAINING PROGRAM

## 2024-10-08 PROCEDURE — 1159F MED LIST DOCD IN RCRD: CPT | Performed by: STUDENT IN AN ORGANIZED HEALTH CARE EDUCATION/TRAINING PROGRAM

## 2024-10-08 PROCEDURE — 3074F SYST BP LT 130 MM HG: CPT | Performed by: STUDENT IN AN ORGANIZED HEALTH CARE EDUCATION/TRAINING PROGRAM

## 2024-10-08 PROCEDURE — 1036F TOBACCO NON-USER: CPT | Performed by: STUDENT IN AN ORGANIZED HEALTH CARE EDUCATION/TRAINING PROGRAM

## 2024-10-08 ASSESSMENT — COLUMBIA-SUICIDE SEVERITY RATING SCALE - C-SSRS
1. IN THE PAST MONTH, HAVE YOU WISHED YOU WERE DEAD OR WISHED YOU COULD GO TO SLEEP AND NOT WAKE UP?: NO
2. HAVE YOU ACTUALLY HAD ANY THOUGHTS OF KILLING YOURSELF?: NO
6. HAVE YOU EVER DONE ANYTHING, STARTED TO DO ANYTHING, OR PREPARED TO DO ANYTHING TO END YOUR LIFE?: NO

## 2024-10-08 ASSESSMENT — PATIENT HEALTH QUESTIONNAIRE - PHQ9
2. FEELING DOWN, DEPRESSED OR HOPELESS: NOT AT ALL
SUM OF ALL RESPONSES TO PHQ9 QUESTIONS 1 AND 2: 0
1. LITTLE INTEREST OR PLEASURE IN DOING THINGS: NOT AT ALL

## 2024-10-08 ASSESSMENT — ENCOUNTER SYMPTOMS
LOSS OF SENSATION IN FEET: 0
OCCASIONAL FEELINGS OF UNSTEADINESS: 0
DEPRESSION: 0

## 2024-10-08 NOTE — PROGRESS NOTES
79-year-old female presenting for ED follow-up.  Had sudden onset right-sided tinnitus.  Went to the ED, imaging done, incidental findings of thyroid nodules recommending ultrasound follow-up, esophageal thickening recommending direct visualization.  Symptoms have essentially resolved.  Does have upcoming ENT already scheduled in approximately 2 weeks.    12 point ROS reviewed and negative other than as stated in HPI    General: Alert, oriented, pleasant, in no acute distress  HEENT: Ear: TM visualized without any abnormal findings     Head: normocephalic, atraumatic;      eyes: EOMI, no scleral icterus;   CV: Heart with regular rate and rhythm, normal S1/S2, no murmurs  Lungs: CTAB without wheezing, rhonchi or rales; good respiratory effort, no increased work of breathing  Neuro: Cranial nerves grossly intact; alert and oriented  Psych: Appropriate mood and affect    #Tinnitus  -Has mostly resolved  - Continue with ENT follow-up in 2 weeks    #Thyroid nodule  -Ultrasound    #espohageal thickening  -EGD    Follow-up for Medicare in January    Christopher D'Amico, DO

## 2024-10-11 ENCOUNTER — HOSPITAL ENCOUNTER (OUTPATIENT)
Dept: RADIOLOGY | Facility: CLINIC | Age: 79
Discharge: HOME | End: 2024-10-11
Payer: MEDICARE

## 2024-10-11 DIAGNOSIS — E04.1 THYROID NODULE: ICD-10-CM

## 2024-10-11 PROCEDURE — 76536 US EXAM OF HEAD AND NECK: CPT

## 2024-10-14 ENCOUNTER — TELEPHONE (OUTPATIENT)
Dept: PRIMARY CARE | Facility: CLINIC | Age: 79
End: 2024-10-14

## 2024-10-14 NOTE — TELEPHONE ENCOUNTER
----- Message from Christopher D'Amico sent at 10/14/2024  1:19 PM EDT -----  Ultrasound shows multiple cystic and benign appearing colloid nodules of the thyroid.  Continue with upcoming ENT appointment.

## 2024-10-14 NOTE — TELEPHONE ENCOUNTER
Result Communication    Resulted Orders   US thyroid    Narrative    Interpreted By:  Tad Cooper,   STUDY:  US THYROID;  10/11/2024 10:47 am      INDICATION:  Signs/Symptoms:thyroid nodule.      ,E04.1 Nontoxic single thyroid nodule      COMPARISON:  03/04/2016      ACCESSION NUMBER(S):  [id number]      ORDERING CLINICIAN:  CHRISTOPHER D'AMICO      TECHNIQUE:  Multiple ultrasonographic images of the thyroid gland were obtained.      FINDINGS:  RIGHT LOBE:  The right lobe of the thyroid measures 2.4 cm x 1.7 cm x 4.4 cm. The  right lobe of the thyroid is heterogenous in echotexture and  demonstrates multiple cystic and benign-appearing colloid nodules.      LEFT LOBE:  Surgically absent. No abnormal soft tissue seen in the thyroidectomy  bed.      ISTHMUS:  The isthmus measures approximately 0.4 cm and is homogeneous in  echotexture and without any identifiable nodules.      CERVICAL LYMPH NODES:  No cervical lymph adenopathy is present.        Impression    Heterogeneous appearance of the right thyroid lobe with multiple  cystic and benign-appearing colloid nodules.      The left thyroid lobe is surgically absent. No abnormal soft tissue  is seen in the thyroidectomy bed.      MACRO:  None      Signed by: Tad Cooper 10/12/2024 12:10 PM  Dictation workstation:   [id number]       2:08 PM      Results were not successfully communicated with the patient and they did not acknowledge their understanding.

## 2024-10-15 ENCOUNTER — TELEPHONE (OUTPATIENT)
Dept: PRIMARY CARE | Facility: CLINIC | Age: 79
End: 2024-10-15

## 2024-10-15 NOTE — TELEPHONE ENCOUNTER
Result Communication    Resulted Orders   US thyroid    Narrative    Interpreted By:  Tad Cooper,   STUDY:  US THYROID;  10/11/2024 10:47 am      INDICATION:  Signs/Symptoms:thyroid nodule.      ,E04.1 Nontoxic single thyroid nodule      COMPARISON:  03/04/2016      ACCESSION NUMBER(S):  [id number]      ORDERING CLINICIAN:  CHRISTOPHER D'AMICO      TECHNIQUE:  Multiple ultrasonographic images of the thyroid gland were obtained.      FINDINGS:  RIGHT LOBE:  The right lobe of the thyroid measures 2.4 cm x 1.7 cm x 4.4 cm. The  right lobe of the thyroid is heterogenous in echotexture and  demonstrates multiple cystic and benign-appearing colloid nodules.      LEFT LOBE:  Surgically absent. No abnormal soft tissue seen in the thyroidectomy  bed.      ISTHMUS:  The isthmus measures approximately 0.4 cm and is homogeneous in  echotexture and without any identifiable nodules.      CERVICAL LYMPH NODES:  No cervical lymph adenopathy is present.        Impression    Heterogeneous appearance of the right thyroid lobe with multiple  cystic and benign-appearing colloid nodules.      The left thyroid lobe is surgically absent. No abnormal soft tissue  is seen in the thyroidectomy bed.      MACRO:  None      Signed by: Tad Cooper 10/12/2024 12:10 PM  Dictation workstation:   [id number]       10:30 AM      Results were successfully communicated with the patient and they acknowledged their understanding.  Pt returned call regarding lab results

## 2024-10-17 ENCOUNTER — APPOINTMENT (OUTPATIENT)
Dept: OPHTHALMOLOGY | Facility: CLINIC | Age: 79
End: 2024-10-17
Payer: MEDICARE

## 2024-10-23 ENCOUNTER — APPOINTMENT (OUTPATIENT)
Dept: PRIMARY CARE | Facility: CLINIC | Age: 79
End: 2024-10-23
Payer: MEDICARE

## 2024-10-24 ENCOUNTER — APPOINTMENT (OUTPATIENT)
Dept: OPHTHALMOLOGY | Facility: CLINIC | Age: 79
End: 2024-10-24
Payer: MEDICARE

## 2024-10-24 DIAGNOSIS — H40.053 BILATERAL OCULAR HYPERTENSION: Primary | ICD-10-CM

## 2024-10-24 PROCEDURE — 99213 OFFICE O/P EST LOW 20 MIN: CPT | Performed by: OPHTHALMOLOGY

## 2024-10-24 ASSESSMENT — ENCOUNTER SYMPTOMS
NEUROLOGICAL NEGATIVE: 0
CONSTITUTIONAL NEGATIVE: 0
MUSCULOSKELETAL NEGATIVE: 0
HEMATOLOGIC/LYMPHATIC NEGATIVE: 0
ALLERGIC/IMMUNOLOGIC NEGATIVE: 0
EYES NEGATIVE: 0
GASTROINTESTINAL NEGATIVE: 0
RESPIRATORY NEGATIVE: 0
CARDIOVASCULAR NEGATIVE: 0
ENDOCRINE NEGATIVE: 0
PSYCHIATRIC NEGATIVE: 0

## 2024-10-24 ASSESSMENT — TONOMETRY
OS_IOP_MMHG: 14
OD_IOP_MMHG: 18
IOP_METHOD: GOLDMANN APPLANATION
IOP_METHOD: TONOPEN
OD_IOP_MMHG: 17
OS_IOP_MMHG: 17

## 2024-10-24 ASSESSMENT — VISUAL ACUITY
OD_CC+: -2
OD_CC: 20/20
OD_PH_SC+: -2
OS_PH_SC: 20/20
OD_PH_SC: 20/20
OS_PH_SC+: -3
OS_CC+: -3
METHOD: SNELLEN - LINEAR
CORRECTION_TYPE: GLASSES
OS_CC: 20/20
OD_SC: 20/40
OD_SC+: -3
OS_SC+: -3
OS_SC: 401

## 2024-10-24 ASSESSMENT — CONF VISUAL FIELD
OS_INFERIOR_NASAL_RESTRICTION: 0
OD_INFERIOR_TEMPORAL_RESTRICTION: 0
OS_NORMAL: 1
OS_INFERIOR_TEMPORAL_RESTRICTION: 0
OD_SUPERIOR_NASAL_RESTRICTION: 0
OD_SUPERIOR_TEMPORAL_RESTRICTION: 0
OD_NORMAL: 1
OS_SUPERIOR_NASAL_RESTRICTION: 0
OD_INFERIOR_NASAL_RESTRICTION: 0
OS_SUPERIOR_TEMPORAL_RESTRICTION: 0

## 2024-10-24 ASSESSMENT — PACHYMETRY
OS_CT(UM): 586
OD_CT(UM): 596

## 2024-10-24 ASSESSMENT — EXTERNAL EXAM - LEFT EYE: OS_EXAM: NORMAL

## 2024-10-24 ASSESSMENT — SLIT LAMP EXAM - LIDS
COMMENTS: NORMAL
COMMENTS: NORMAL

## 2024-10-24 ASSESSMENT — CUP TO DISC RATIO
OS_RATIO: 0.3
OD_RATIO: 0.2

## 2024-10-24 ASSESSMENT — EXTERNAL EXAM - RIGHT EYE: OD_EXAM: NORMAL

## 2024-10-24 NOTE — PROGRESS NOTES
10/24/2024 CC: 79 y.o. presents for glaucoma FU, Dr Contreras.    Past ocular history: .OHT, Pseudophakia OU  Family history: glaucoma in father  Past medical history: Asthma, HTN, CKD, others per chart   Social history: denies tobacco     Eye medications:   Both eyes: Latanoprost qhs     Allergy:  As per chart     Testing:    None    Assessment:   - ocular htn both eyes  tmax 28/29  no family hx  no trauma  gonio open  pach thick  overall no correlation between nerves, oct and field, no clear evidence of glaucoma on testing  s/pce/iol/abic OU 6/21 and 7/21 OS and OD  goal <20  IOP is great!  continue latan qhs  RTC 6 months for IOP check  10/24/2024 : IOP 17 OU, [facility]/exam stable      Plan:   I explained my findings. OHT, IOP at target.    Eye medications:   Both eyes: continue Latanoprost qhs     Return in 6 mo, Dr Contreras/ OCT

## 2024-10-29 ENCOUNTER — APPOINTMENT (OUTPATIENT)
Dept: OTOLARYNGOLOGY | Facility: CLINIC | Age: 79
End: 2024-10-29
Payer: MEDICARE

## 2024-10-29 ENCOUNTER — APPOINTMENT (OUTPATIENT)
Dept: AUDIOLOGY | Facility: CLINIC | Age: 79
End: 2024-10-29
Payer: MEDICARE

## 2024-11-04 ENCOUNTER — APPOINTMENT (OUTPATIENT)
Dept: GASTROENTEROLOGY | Facility: CLINIC | Age: 79
End: 2024-11-04
Payer: MEDICARE

## 2024-11-07 ENCOUNTER — TELEPHONE (OUTPATIENT)
Dept: ENDOCRINOLOGY | Facility: CLINIC | Age: 79
End: 2024-11-07
Payer: MEDICARE

## 2024-11-13 NOTE — ADDENDUM NOTE
Addended by: AARON DE LOS SANTOS on: 7/18/2023 01:17 PM     Modules accepted: Orders     Mom called office stating received a text from [employer] that they are waiting on approval from PCP for generic ammphetamine-dextroamphetamine     Looks like we sent in for generic, mom is going to call [employer] and call the office back, or let our office know when she brings other daughter to her appt

## 2024-11-25 DIAGNOSIS — E78.5 HYPERLIPIDEMIA LDL GOAL <100: ICD-10-CM

## 2024-11-25 RX ORDER — SIMVASTATIN 20 MG/1
20 TABLET, FILM COATED ORAL DAILY
Qty: 90 TABLET | Refills: 0 | Status: SHIPPED | OUTPATIENT
Start: 2024-11-25

## 2024-11-26 ENCOUNTER — APPOINTMENT (OUTPATIENT)
Dept: OTOLARYNGOLOGY | Facility: CLINIC | Age: 79
End: 2024-11-26

## 2024-11-30 DIAGNOSIS — I10 PRIMARY HYPERTENSION: ICD-10-CM

## 2024-12-04 RX ORDER — AMLODIPINE BESYLATE 10 MG/1
10 TABLET ORAL DAILY
Qty: 90 TABLET | Refills: 0 | Status: SHIPPED | OUTPATIENT
Start: 2024-12-04

## 2024-12-12 NOTE — PROGRESS NOTES
FUV for diabetes. LV with me 2024.    History of Present Illness  79 y.o. female with H/O type 2 diabetes, HTN, HLD.     Dx:   HbA1c: 6.8% (2024), 7.5% (2024), 6.5% (2024), 6.7% (2023), 8.1% (2023), 8.2% (2023), 6.9% (2022), 7.7% (2022), 8.2% (2022), 8.5% (2021), 7.8% (2021)  Current regimen: glipizide 10 mg BID, metformin 1g BID, Lantus 15 units QHS, Trulicity 4.5 mg/week  Past medications: Januvia  Complications:none  Comorbidities: HTN, HLD     SMBG: twice day  Fasting: low 130s-160s  Bedtime: mid 100s (rarely checks)     Hypoglycemia: no    SMB-2 times per day    Diet: low-carb , Usually 2 meals / day (breakfast and dinner)   Sometimes she skips a meal and drinks Glucerna   Snacks all day: nuts, chips, ice cream bar  Loves potatoes  Takes coffee with cream usually once a day, diet soda  Drinks juice very occasionally.      Exercise: 1 hour on the bike every other day    TODAY:  -overall, doing well    ROS  General: no fever or chills  CV: no chest pain   Respiratory: no shortness of breath  MSK: no lower extremity edema  Neuro: no headache or dizziness  See HPI for Endocrine ROS    Past Medical History:   Diagnosis Date    Disorder of thyroid, unspecified 2016    Thyroid mass    Dry eye syndrome of bilateral lacrimal glands 2019    Bilateral dry eyes    Dry eye syndrome of bilateral lacrimal glands 2019    Bilateral dry eyes    Dry eye syndrome of bilateral lacrimal glands 2019    Bilateral dry eyes    Essential (primary) hypertension 2022    Hypertension    Personal history of malignant neoplasm of other parts of uterus 2016    History of cancer of uterus    Personal history of other endocrine, nutritional and metabolic disease     History of diabetes mellitus    Preglaucoma, unspecified, bilateral 2019    Glaucoma suspect of both eyes    Preglaucoma, unspecified, bilateral 2019    Glaucoma suspect of  both eyes    Preglaucoma, unspecified, 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    Glaucoma suspect of both eyes    Ulcerative blepharitis unspecified eye, 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/13/2019    Blepharitis, ulcerative    Unspecified asthma, uncomplicated ([facility]) 08/25/2016    Asthma    Vitreous degeneration, 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    Bilateral vitreous detachment    Vitreous degeneration, 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    Bilateral vitreous detachment    Vitreous degeneration, 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    Bilateral vitreous detachment       Past Surgical History:   Procedure Laterality Date    HYSTERECTOMY  02/24/2016    Hysterectomy    THYROID SURGERY  04/05/2016    Thyroid Surgery    TUBAL LIGATION  01/13/2016    Tubal Ligation       Social History     Socioeconomic History    Marital status:      Spouse name: Not on file    Number of children: Not on file    Years of education: Not on file    Highest education level: Not on file   Occupational History    Not on file   Tobacco Use    Smoking status: Never     Passive exposure: Never    Smokeless tobacco: Never   Substance and Sexual Activity    Alcohol use: Never    Drug use: Never    Sexual activity: Not on file   Other Topics Concern    Not on file   Social History Narrative    Not on file     Social Drivers of Health     Financial Resource Strain: Not on file   Food Insecurity: Not on file   Transportation Needs: Not on file   Physical Activity: Not on file   Stress: Not on file   Social Connections: Not on file   Intimate Partner Violence: Not on file   Housing Stability: Not on file       Physical Exam   vitals were not taken for this visit.   General: not in acute distress  HEENT: MARY, EOMI  Thyroid: no goiter  Neuro: alert and oriented x 3    Current Outpatient Medications   Medication Sig Dispense Refill    acetaminophen (Tylenol Extra Strength) 500 mg tablet TAKE 1 TABLET EVERY 4 TO 6 HOURS AS NEEDED.      albuterol (ProAir HFA) 90 mcg/actuation inhaler Inhale 2 puffs every 4 hours if  "needed for shortness of breath. 18 g 11    amLODIPine (Norvasc) 10 mg tablet Take 1 tablet by mouth once daily 90 tablet 0    aspirin 81 mg EC tablet Take 1 tablet (81 mg) by mouth once daily.      blood sugar diagnostic strip Use to check blood sugar 3 times per day 300 each 0    cyclobenzaprine (Flexeril) 10 mg tablet Take 1 tablet (10 mg) by mouth once daily at bedtime.      dulaglutide (Trulicity) 4.5 mg/0.5 mL pen injector Inject 4.5 mg under the skin 1 (one) time per week. 6 mL 3    esomeprazole (NexIUM) 40 mg DR capsule Take 1 capsule (40 mg) by mouth 2 times a day before meals. Take the medicine 30-45 minute before meals. Do not open capsule. 180 capsule 1    glipiZIDE (Glucotrol) 10 mg tablet Take 1 tablet (10 mg) by mouth 2 times a day before meals. 180 tablet 1    lancets (OneTouch Delica Lancets) 33 gauge misc 1 each 3 times a day.      Lanmarlous Solostar U-100 Insulin 100 unit/mL (3 mL) pen Inject 15 units at bedtime 15 mL 1    latanoprost (Xalatan) 0.005 % ophthalmic solution INSTILL 1 DROP INTO EACH EYE AT BEDTIME 9 mL 0    lisinopriL-hydrochlorothiazide 20-25 mg tablet Take 1 tablet by mouth once daily. 90 tablet 1    metFORMIN (Glucophage) 1,000 mg tablet Take 1 tablet (1,000 mg) by mouth 2 times daily (morning and late afternoon). 180 tablet 1    metoprolol succinate XL (Toprol-XL) 50 mg 24 hr tablet Take 1 tablet (50 mg) by mouth once daily. Do not crush or chew. 90 tablet 1    multivitamin tablet Take 1 tablet by mouth once daily.      pantoprazole (ProtoNix) 40 mg EC tablet Take 1 tablet (40 mg) by mouth once daily. Do not crush, chew, or split. 90 tablet 1    pen needle, diabetic (BD Ultra-Fine Terri Pen Needle) 32 gauge x 5/32\" needle One once daily with insulin injection. 100 each 3    polyethylene glycol-electrolytes 420 gram solution STARTING AT 6PM DRINK UNTIL YOU GET TO HALF OF THE BOTTLE.  DRINK THE OTHER HALF 5HRS BEFORE ARRIVAL TIME. 4000 mL 0    simvastatin (Zocor) 20 mg tablet Take 1 " tablet by mouth once daily 90 tablet 0     No current facility-administered medications for this visit.         Assessment and Plan  79 y.o. female with hx of T2DM, HTN, HLD, here for follow-up of T2DM.     1. Type 2 diabetes mellitus, uncontrolled  2. Hypertension  3. Hyperlipidemia  HbA1c: 6.8% (12/16/2024), 7.5% (8/7/2024), 6.5% (01/2024), 6.7% (12/20/2023), 8.1% (5/18/2023), 8.2% (1/16/23), 6.9% (8/24/2022), 7.7% (5/18/2022), 8.2% (02/11/2022), 8.5% (9/15/2021), 7.8% (02/2021)  Current regimen: glipizide 10 mg BID, metformin 1g BID, Lantus 15 units QHS, Trulicity 4.5 mg/week  Eye exam: no DR (10/2024)  Urine microalbumin: 11.5 ug/mg (1/2024) on ACE-I  Podiatry: due  Lipids: HDL 48, LDL 50, TG NA (01/2024) on simvastatin 20 mg     A1c: 6.8% today!  Doing very well.  No changes to regimen needed today.  Reports constipation. She is going to try miralax.    Reapply for Shweeb PAP.  Apply for Basaglar and Trulicity.    She did receive a letter stating that she may no longer qualify for Trulicity this year (income criteria is now 300% federal poverty line). According to her tax return, she is a 2 person house hold with $67224/year, so she should still qualify. If her application is denied this year, will try for Bring Light PAP.    PLAN:  -continue Lantus 15 units at bedtime-->apply for Basaglar   -continue Trulicity 4.5 mg/week  -continue glipizide, metformin  -continue to check blood sugars twice a day (fasting and bedtime)  -check HbA1c, urine microalbumin, lipids before next visit    Follow-up in 4 months (labs prior)

## 2024-12-16 ENCOUNTER — APPOINTMENT (OUTPATIENT)
Dept: ENDOCRINOLOGY | Facility: CLINIC | Age: 79
End: 2024-12-16
Payer: MEDICARE

## 2024-12-16 VITALS
SYSTOLIC BLOOD PRESSURE: 153 MMHG | WEIGHT: 168 LBS | HEART RATE: 80 BPM | DIASTOLIC BLOOD PRESSURE: 76 MMHG | HEIGHT: 62 IN | BODY MASS INDEX: 30.91 KG/M2

## 2024-12-16 DIAGNOSIS — E11.65 TYPE 2 DIABETES MELLITUS WITH HYPERGLYCEMIA, UNSPECIFIED WHETHER LONG TERM INSULIN USE (MULTI): Primary | ICD-10-CM

## 2024-12-16 LAB — POC HEMOGLOBIN A1C: 6.8 % (ref 4.2–6.5)

## 2024-12-16 PROCEDURE — G2211 COMPLEX E/M VISIT ADD ON: HCPCS | Performed by: STUDENT IN AN ORGANIZED HEALTH CARE EDUCATION/TRAINING PROGRAM

## 2024-12-16 PROCEDURE — 1126F AMNT PAIN NOTED NONE PRSNT: CPT | Performed by: STUDENT IN AN ORGANIZED HEALTH CARE EDUCATION/TRAINING PROGRAM

## 2024-12-16 PROCEDURE — 1159F MED LIST DOCD IN RCRD: CPT | Performed by: STUDENT IN AN ORGANIZED HEALTH CARE EDUCATION/TRAINING PROGRAM

## 2024-12-16 PROCEDURE — 3078F DIAST BP <80 MM HG: CPT | Performed by: STUDENT IN AN ORGANIZED HEALTH CARE EDUCATION/TRAINING PROGRAM

## 2024-12-16 PROCEDURE — 83036 HEMOGLOBIN GLYCOSYLATED A1C: CPT | Performed by: STUDENT IN AN ORGANIZED HEALTH CARE EDUCATION/TRAINING PROGRAM

## 2024-12-16 PROCEDURE — 99215 OFFICE O/P EST HI 40 MIN: CPT | Performed by: STUDENT IN AN ORGANIZED HEALTH CARE EDUCATION/TRAINING PROGRAM

## 2024-12-16 PROCEDURE — 1036F TOBACCO NON-USER: CPT | Performed by: STUDENT IN AN ORGANIZED HEALTH CARE EDUCATION/TRAINING PROGRAM

## 2024-12-16 PROCEDURE — 3077F SYST BP >= 140 MM HG: CPT | Performed by: STUDENT IN AN ORGANIZED HEALTH CARE EDUCATION/TRAINING PROGRAM

## 2024-12-16 RX ORDER — PEN NEEDLE, DIABETIC 30 GX3/16"
NEEDLE, DISPOSABLE MISCELLANEOUS
Qty: 100 EACH | Refills: 3 | Status: SHIPPED | OUTPATIENT
Start: 2024-12-16

## 2024-12-16 ASSESSMENT — PAIN SCALES - GENERAL: PAINLEVEL_OUTOF10: 0-NO PAIN

## 2024-12-16 NOTE — PATIENT INSTRUCTIONS
We will reapply for the [facility] patient assistance program for the Trulicity and long-acting insulin called Basaglar (this is the same as Lantus)    Continue Lantus/Basaglar 15 units at bedtime  Continue Trulicity 4.5 mg, once a week  Continue glipizide and metformin    *Please schedule podiatry appointment    *Please have labs done (blood and urine tests) about 1 week before your next visit with me in April

## 2024-12-19 DIAGNOSIS — H40.002 GLAUCOMA SUSPECT OF LEFT EYE: ICD-10-CM

## 2024-12-19 DIAGNOSIS — H40.053 BILATERAL OCULAR HYPERTENSION: ICD-10-CM

## 2024-12-19 DIAGNOSIS — H40.001 GLAUCOMA SUSPECT OF RIGHT EYE: ICD-10-CM

## 2024-12-19 RX ORDER — LATANOPROST 50 UG/ML
SOLUTION/ DROPS OPHTHALMIC
Qty: 9 ML | Refills: 3 | Status: SHIPPED | OUTPATIENT
Start: 2024-12-19

## 2024-12-23 ENCOUNTER — APPOINTMENT (OUTPATIENT)
Dept: GASTROENTEROLOGY | Facility: CLINIC | Age: 79
End: 2024-12-23
Payer: MEDICARE

## 2024-12-23 DIAGNOSIS — K21.9 GASTROESOPHAGEAL REFLUX DISEASE WITHOUT ESOPHAGITIS: ICD-10-CM

## 2024-12-23 DIAGNOSIS — K31.84 GASTROPARESIS: ICD-10-CM

## 2024-12-23 DIAGNOSIS — E11.69 TYPE 2 DIABETES MELLITUS WITH OTHER SPECIFIED COMPLICATION, WITH LONG-TERM CURRENT USE OF INSULIN: ICD-10-CM

## 2024-12-23 DIAGNOSIS — Z79.4 TYPE 2 DIABETES MELLITUS WITH OTHER SPECIFIED COMPLICATION, WITH LONG-TERM CURRENT USE OF INSULIN: ICD-10-CM

## 2024-12-23 PROCEDURE — 99214 OFFICE O/P EST MOD 30 MIN: CPT | Performed by: INTERNAL MEDICINE

## 2024-12-23 NOTE — PROGRESS NOTES
"Primary Care Provider: Owen Izquierdo MD  Referring Provider: Juan C Rodríguez DO  My final recommendations will be communicated back to the referring physician and/or primary care provider via shared medical records or via US mail.    Chief Complaint (Reason for visit):   Nii Colunga is a 79 y.o. female who presents for heartburn and early satiety    ASSESSMENT AND PLAN     Assessment & Plan  Gastroparesis  Patient's symptoms are classic for gastroparesis.  She could have autonomic neuropathy secondary to longstanding diabetes.  Superimposed use of Trulicity has been likely contributed to the patient's symptoms as well    12/23/2024  Pt states she is doing well on omeprazole once a day  (Nexium did not work for her)  She is still on Trulicity  CT showed esophageal wall thickening and EGD was ordered.    -In layman language, I discussed the rationale, risks benefits and alternatives of doing an upper endoscopy to further evaluate esophageal wall thickening.  Patient states that she is going to be EGD next year.  She does not want to go anymore procedures.  She understands that there could be cancer and that which we would miss if she does not get the endoscopy done.  He delaying the diagnosis could lead to worse prognosis.  Patient still does not want to do an endoscopy at this point, primarily as she \"feels well\"    Gastroesophageal reflux disease without esophagitis    Did not respond to Nexium twice a day  She is back on omeprazole once a day    Slow transit constipation  She likely has slow transit constipation against secondary to autonomic neuropathy from diabetes as well as contribution from Trulicity    6/2024-colonoscopy-good prep, no polyps    -Will ask her to start taking MiraLAX every day and assess response       Brock Reynaga MD, MS  12/23/2024      SUBJECTIVE   HPI: History obtained from patient    12/23/2024  Pt states she is doing well on omeprazole once a day  (Nexium did not work for " her)  She is still on Trulicity  Patient feels well from her heartburn and gastroparesis symptoms.     CT showed esophageal wall thickening and EGD was ordered.  Patient does not want to have the upper endoscopy done      9/2024 - Initial visit  79-year-old female who comes to see me for acid reflux    She endorses heartburn and bitter taste to her mouth.   She already is on pantoprazole once a day    RF  + Long-term diabetes on insulin  + Use of Trulicity    B) she also mentions constipation.  She moves her bowels about 4 times a week, [place] scale 1-3  She has a feeling of incomplete evacuation as well      Past Medical History:   Diagnosis Date    Disorder of thyroid, unspecified 05/25/2016    Thyroid mass    Dry eye syndrome of bilateral lacrimal glands 09/13/2019    Bilateral dry eyes    Dry eye syndrome of bilateral lacrimal glands 09/13/2019    Bilateral dry eyes    Dry eye syndrome of bilateral lacrimal glands 09/13/2019    Bilateral dry eyes    Essential (primary) hypertension 08/23/2022    Hypertension    Personal history of malignant neoplasm of other parts of uterus 01/13/2016    History of cancer of uterus    Personal history of other endocrine, nutritional and metabolic disease     History of diabetes mellitus    Preglaucoma, unspecified, 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    Glaucoma suspect of both eyes    Preglaucoma, unspecified, 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    Glaucoma suspect of both eyes    Preglaucoma, unspecified, 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    Glaucoma suspect of both eyes    Ulcerative blepharitis unspecified eye, 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/13/2019    Blepharitis, ulcerative    Unspecified asthma, uncomplicated ([facility]) 08/25/2016    Asthma    Vitreous degeneration, 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    Bilateral vitreous detachment    Vitreous degeneration, 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    Bilateral vitreous detachment    Vitreous degeneration, 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    Bilateral vitreous detachment       Past Surgical History:   Procedure  "Laterality Date    HYSTERECTOMY  02/24/2016    Hysterectomy    THYROID SURGERY  04/05/2016    Thyroid Surgery    TUBAL LIGATION  01/13/2016    Tubal Ligation       Current Outpatient Medications   Medication Sig Dispense Refill    acetaminophen (Tylenol Extra Strength) 500 mg tablet TAKE 1 TABLET EVERY 4 TO 6 HOURS AS NEEDED.      albuterol (ProAir HFA) 90 mcg/actuation inhaler Inhale 2 puffs every 4 hours if needed for shortness of breath. 18 g 11    amLODIPine (Norvasc) 10 mg tablet Take 1 tablet by mouth once daily 90 tablet 0    aspirin 81 mg EC tablet Take 1 tablet (81 mg) by mouth once daily.      blood sugar diagnostic strip Use to check blood sugar 3 times per day 300 each 0    cyclobenzaprine (Flexeril) 10 mg tablet Take 1 tablet (10 mg) by mouth once daily at bedtime.      dulaglutide (Trulicity) 4.5 mg/0.5 mL pen injector Inject 4.5 mg under the skin 1 (one) time per week. 6 mL 3    glipiZIDE (Glucotrol) 10 mg tablet Take 1 tablet (10 mg) by mouth 2 times a day before meals. 180 tablet 1    lancets (OneTouch Delica Lancets) 33 gauge misc 1 each 3 times a day.      Lantus Solostar U-100 Insulin 100 unit/mL (3 mL) pen Inject 15 units at bedtime 15 mL 1    latanoprost (Xalatan) 0.005 % ophthalmic solution INSTILL 1 DROP INTO EACH EYE AT BEDTIME 9 mL 0    lisinopriL-hydrochlorothiazide 20-25 mg tablet Take 1 tablet by mouth once daily. 90 tablet 1    metFORMIN (Glucophage) 1,000 mg tablet Take 1 tablet (1,000 mg) by mouth 2 times daily (morning and late afternoon). 180 tablet 1    metoprolol succinate XL (Toprol-XL) 50 mg 24 hr tablet Take 1 tablet (50 mg) by mouth once daily. Do not crush or chew. 90 tablet 1    multivitamin tablet Take 1 tablet by mouth once daily.      pantoprazole (ProtoNix) 40 mg EC tablet Take 1 tablet (40 mg) by mouth once daily. Do not crush, chew, or split. 90 tablet 1    pen needle, diabetic (BD Ultra-Fine Terri Pen Needle) 32 gauge x 5/32\" needle One once daily with insulin " "injection. 100 each 3    polyethylene glycol-electrolytes 420 gram solution STARTING AT 6PM DRINK UNTIL YOU GET TO HALF OF THE BOTTLE.  DRINK THE OTHER HALF 5HRS BEFORE ARRIVAL TIME. 4000 mL 0    simvastatin (Zocor) 20 mg tablet Take 1 tablet by mouth once daily 90 tablet 0     No current facility-administered medications for this visit.       No Known Allergies  OBJECTIVE   PHYSICAL EXAM:  Pulse 82   Ht 1.575 m (5' 2\")   Wt 76.7 kg (169 lb)   SpO2 98%   BMI 30.91 kg/m²      LABS/IMAGING/SCOPES  Lab Results   Component Value Date    WBC 9.0 07/25/2024    HGB 13.4 07/25/2024    HCT 41.1 07/25/2024    MCV 86 07/25/2024     07/25/2024     Lab Results   Component Value Date    GLUCOSE 120 (H) 07/25/2024    CALCIUM 10.0 07/25/2024     07/25/2024    K 4.1 07/25/2024    CO2 25 07/25/2024     07/25/2024    BUN 20 07/25/2024    CREATININE 1.26 (H) 07/25/2024     Lab Results   Component Value Date    ALT 14 07/25/2024    AST 17 07/25/2024    ALKPHOS 61 07/25/2024    BILITOT 0.3 07/25/2024       === 07/25/24 ===    CT ABDOMEN PELVIS ANGIOGRAM W AND/OR WO IV CONTRAST    - Impression -  1. No evidence of active gastrointestinal hemorrhage. If ongoing  concern for GI bleed then a nuclear medicine GI bleeding scan may be  performed in further assessment.  2. No acute process within the abdomen or pelvis.  3. Hepatic steatosis.            Signed by: Alcon Booth 7/25/2024 7:38 PM  Dictation workstation:   [id number]    Brock Reynaga MD, MS  9/23/2024  "

## 2024-12-23 NOTE — LETTER
December 23, 2024     [place]  [address] [facility], [address]  [facility] [address]    Patient: Nii Colunga   YOB: 1945   Date of Visit: 12/23/2024       Dear Dr. Christopher D'Amico, DO:    Thank you for referring Nii Colunga to me for evaluation. Below are my notes for this consultation.  If you have questions, please do not hesitate to call me. I look forward to following your patient along with you.       Sincerely,     Brock Reynaga MD, MS      CC: No Recipients  ______________________________________________________________________________________    Primary Care Provider: Owen Izquierdo MD  Referring Provider: Juan C Rodríguez DO  My final recommendations will be communicated back to the referring physician and/or primary care provider via shared medical records or via US mail.    Chief Complaint (Reason for visit):   Nii Colunga is a 79 y.o. female who presents for heartburn and early satiety    ASSESSMENT AND PLAN     Assessment & Plan  Gastroparesis  Patient's symptoms are classic for gastroparesis.  She could have autonomic neuropathy secondary to longstanding diabetes.  Superimposed use of Trulicity has been likely contributed to the patient's symptoms as well    12/23/2024  Pt states she is doing well on omeprazole once a day  (Nexium did not work for her)  She is still on Trulicity  CT showed esophageal wall thickening and EGD was ordered.    -In layman language, I discussed the rationale, risks benefits and alternatives of doing an upper endoscopy to further evaluate esophageal wall thickening.  Patient states that she is going to be EGD next year.  She does not want to go anymore procedures.  She understands that there could be cancer and that which we would miss if she does not get the endoscopy done.  He delaying the diagnosis could lead to worse prognosis.  Patient still does not want to do an endoscopy at this point, primarily as she  "\"feels well\"    Gastroesophageal reflux disease without esophagitis    Did not respond to Nexium twice a day  She is back on omeprazole once a day    Slow transit constipation  She likely has slow transit constipation against secondary to autonomic neuropathy from diabetes as well as contribution from Trulicity    6/2024-colonoscopy-good prep, no polyps    -Will ask her to start taking MiraLAX every day and assess response       Brock Reynaga MD, MS  12/23/2024      SUBJECTIVE   HPI: History obtained from patient    12/23/2024  Pt states she is doing well on omeprazole once a day  (Nexium did not work for her)  She is still on Trulicity  Patient feels well from her heartburn and gastroparesis symptoms.     CT showed esophageal wall thickening and EGD was ordered.  Patient does not want to have the upper endoscopy done      9/2024 - Initial visit  79-year-old female who comes to see me for acid reflux    She endorses heartburn and bitter taste to her mouth.   She already is on pantoprazole once a day    RF  + Long-term diabetes on insulin  + Use of Trulicity    B) she also mentions constipation.  She moves her bowels about 4 times a week, [place] scale 1-3  She has a feeling of incomplete evacuation as well      Past Medical History:   Diagnosis Date   • Disorder of thyroid, unspecified 05/25/2016    Thyroid mass   • Dry eye syndrome of bilateral lacrimal glands 09/13/2019    Bilateral dry eyes   • Dry eye syndrome of bilateral lacrimal glands 09/13/2019    Bilateral dry eyes   • Dry eye syndrome of bilateral lacrimal glands 09/13/2019    Bilateral dry eyes   • Essential (primary) hypertension 08/23/2022    Hypertension   • Personal history of malignant neoplasm of other parts of uterus 01/13/2016    History of cancer of uterus   • Personal history of other endocrine, nutritional and metabolic disease     History of diabetes mellitus   • Preglaucoma, unspecified, 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    Glaucoma suspect of both eyes "   • Preglaucoma, unspecified, 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    Glaucoma suspect of both eyes   • Preglaucoma, unspecified, 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    Glaucoma suspect of both eyes   • Ulcerative blepharitis unspecified eye, 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/13/2019    Blepharitis, ulcerative   • Unspecified asthma, uncomplicated ([facility]) 08/25/2016    Asthma   • Vitreous degeneration, 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    Bilateral vitreous detachment   • Vitreous degeneration, 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    Bilateral vitreous detachment   • Vitreous degeneration, 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    Bilateral vitreous detachment       Past Surgical History:   Procedure Laterality Date   • HYSTERECTOMY  02/24/2016    Hysterectomy   • THYROID SURGERY  04/05/2016    Thyroid Surgery   • TUBAL LIGATION  01/13/2016    Tubal Ligation       Current Outpatient Medications   Medication Sig Dispense Refill   • acetaminophen (Tylenol Extra Strength) 500 mg tablet TAKE 1 TABLET EVERY 4 TO 6 HOURS AS NEEDED.     • albuterol (ProAir HFA) 90 mcg/actuation inhaler Inhale 2 puffs every 4 hours if needed for shortness of breath. 18 g 11   • amLODIPine (Norvasc) 10 mg tablet Take 1 tablet by mouth once daily 90 tablet 0   • aspirin 81 mg EC tablet Take 1 tablet (81 mg) by mouth once daily.     • blood sugar diagnostic strip Use to check blood sugar 3 times per day 300 each 0   • cyclobenzaprine (Flexeril) 10 mg tablet Take 1 tablet (10 mg) by mouth once daily at bedtime.     • dulaglutide (Trulicity) 4.5 mg/0.5 mL pen injector Inject 4.5 mg under the skin 1 (one) time per week. 6 mL 3   • glipiZIDE (Glucotrol) 10 mg tablet Take 1 tablet (10 mg) by mouth 2 times a day before meals. 180 tablet 1   • lancets (OneTouch Delica Lancets) 33 gauge misc 1 each 3 times a day.     • Lantus Solostar U-100 Insulin 100 unit/mL (3 mL) pen Inject 15 units at bedtime 15 mL 1   • latanoprost (Xalatan) 0.005 % ophthalmic solution INSTILL 1 DROP INTO EACH EYE AT BEDTIME 9 mL 0   •  "lisinopriL-hydrochlorothiazide 20-25 mg tablet Take 1 tablet by mouth once daily. 90 tablet 1   • metFORMIN (Glucophage) 1,000 mg tablet Take 1 tablet (1,000 mg) by mouth 2 times daily (morning and late afternoon). 180 tablet 1   • metoprolol succinate XL (Toprol-XL) 50 mg 24 hr tablet Take 1 tablet (50 mg) by mouth once daily. Do not crush or chew. 90 tablet 1   • multivitamin tablet Take 1 tablet by mouth once daily.     • pantoprazole (ProtoNix) 40 mg EC tablet Take 1 tablet (40 mg) by mouth once daily. Do not crush, chew, or split. 90 tablet 1   • pen needle, diabetic (BD Ultra-Fine Terri Pen Needle) 32 gauge x 5/32\" needle One once daily with insulin injection. 100 each 3   • polyethylene glycol-electrolytes 420 gram solution STARTING AT 6PM DRINK UNTIL YOU GET TO HALF OF THE BOTTLE.  DRINK THE OTHER HALF 5HRS BEFORE ARRIVAL TIME. 4000 mL 0   • simvastatin (Zocor) 20 mg tablet Take 1 tablet by mouth once daily 90 tablet 0     No current facility-administered medications for this visit.       No Known Allergies  OBJECTIVE   PHYSICAL EXAM:  Pulse 82   Ht 1.575 m (5' 2\")   Wt 76.7 kg (169 lb)   SpO2 98%   BMI 30.91 kg/m²      LABS/IMAGING/SCOPES  Lab Results   Component Value Date    WBC 9.0 07/25/2024    HGB 13.4 07/25/2024    HCT 41.1 07/25/2024    MCV 86 07/25/2024     07/25/2024     Lab Results   Component Value Date    GLUCOSE 120 (H) 07/25/2024    CALCIUM 10.0 07/25/2024     07/25/2024    K 4.1 07/25/2024    CO2 25 07/25/2024     07/25/2024    BUN 20 07/25/2024    CREATININE 1.26 (H) 07/25/2024     Lab Results   Component Value Date    ALT 14 07/25/2024    AST 17 07/25/2024    ALKPHOS 61 07/25/2024    BILITOT 0.3 07/25/2024       === 07/25/24 ===    CT ABDOMEN PELVIS ANGIOGRAM W AND/OR WO IV CONTRAST    - Impression -  1. No evidence of active gastrointestinal hemorrhage. If ongoing  concern for GI bleed then a nuclear medicine GI bleeding scan may be  performed in further " assessment.  2. No acute process within the abdomen or pelvis.  3. Hepatic steatosis.            Signed by: Alcon Booth 7/25/2024 7:38 PM  Dictation workstation:   [id number]    Brock Reynaga MD, MS  9/23/2024

## 2025-01-08 ENCOUNTER — APPOINTMENT (OUTPATIENT)
Dept: PRIMARY CARE | Facility: CLINIC | Age: 80
End: 2025-01-08
Payer: MEDICARE

## 2025-01-08 VITALS
HEART RATE: 75 BPM | OXYGEN SATURATION: 98 % | SYSTOLIC BLOOD PRESSURE: 131 MMHG | HEIGHT: 62 IN | BODY MASS INDEX: 30.73 KG/M2 | DIASTOLIC BLOOD PRESSURE: 74 MMHG | WEIGHT: 167 LBS

## 2025-01-08 DIAGNOSIS — Z00.00 MEDICARE ANNUAL WELLNESS VISIT, SUBSEQUENT: ICD-10-CM

## 2025-01-08 DIAGNOSIS — Z78.0 ASYMPTOMATIC MENOPAUSAL STATE: ICD-10-CM

## 2025-01-08 DIAGNOSIS — E78.5 HYPERLIPIDEMIA LDL GOAL <100: ICD-10-CM

## 2025-01-08 DIAGNOSIS — E11.69 TYPE 2 DIABETES MELLITUS WITH OTHER SPECIFIED COMPLICATION, WITH LONG-TERM CURRENT USE OF INSULIN: ICD-10-CM

## 2025-01-08 DIAGNOSIS — K22.89 ESOPHAGEAL THICKENING: ICD-10-CM

## 2025-01-08 DIAGNOSIS — Z79.4 TYPE 2 DIABETES MELLITUS WITH OTHER SPECIFIED COMPLICATION, WITH LONG-TERM CURRENT USE OF INSULIN: ICD-10-CM

## 2025-01-08 DIAGNOSIS — Z00.00 HEALTHCARE MAINTENANCE: ICD-10-CM

## 2025-01-08 DIAGNOSIS — Z12.31 ENCOUNTER FOR SCREENING MAMMOGRAM FOR MALIGNANT NEOPLASM OF BREAST: ICD-10-CM

## 2025-01-08 DIAGNOSIS — E04.1 THYROID NODULE: ICD-10-CM

## 2025-01-08 DIAGNOSIS — N18.32 STAGE 3B CHRONIC KIDNEY DISEASE (MULTI): ICD-10-CM

## 2025-01-08 DIAGNOSIS — Z79.4 TYPE 2 DIABETES MELLITUS WITHOUT COMPLICATION, WITH LONG-TERM CURRENT USE OF INSULIN (MULTI): Primary | ICD-10-CM

## 2025-01-08 DIAGNOSIS — I10 PRIMARY HYPERTENSION: ICD-10-CM

## 2025-01-08 DIAGNOSIS — K21.9 GASTROESOPHAGEAL REFLUX DISEASE, UNSPECIFIED WHETHER ESOPHAGITIS PRESENT: ICD-10-CM

## 2025-01-08 DIAGNOSIS — E55.9 VITAMIN D DEFICIENCY: ICD-10-CM

## 2025-01-08 DIAGNOSIS — J45.20 MILD INTERMITTENT ASTHMA WITHOUT COMPLICATION (HHS-HCC): ICD-10-CM

## 2025-01-08 DIAGNOSIS — N18.30 STAGE 3 CHRONIC KIDNEY DISEASE, UNSPECIFIED WHETHER STAGE 3A OR 3B CKD (MULTI): ICD-10-CM

## 2025-01-08 DIAGNOSIS — E11.9 TYPE 2 DIABETES MELLITUS WITHOUT COMPLICATION, WITH LONG-TERM CURRENT USE OF INSULIN (MULTI): Primary | ICD-10-CM

## 2025-01-08 PROCEDURE — 1124F ACP DISCUSS-NO DSCNMKR DOCD: CPT | Performed by: STUDENT IN AN ORGANIZED HEALTH CARE EDUCATION/TRAINING PROGRAM

## 2025-01-08 PROCEDURE — 3078F DIAST BP <80 MM HG: CPT | Performed by: STUDENT IN AN ORGANIZED HEALTH CARE EDUCATION/TRAINING PROGRAM

## 2025-01-08 PROCEDURE — G0439 PPPS, SUBSEQ VISIT: HCPCS | Performed by: STUDENT IN AN ORGANIZED HEALTH CARE EDUCATION/TRAINING PROGRAM

## 2025-01-08 PROCEDURE — 1036F TOBACCO NON-USER: CPT | Performed by: STUDENT IN AN ORGANIZED HEALTH CARE EDUCATION/TRAINING PROGRAM

## 2025-01-08 PROCEDURE — 1160F RVW MEDS BY RX/DR IN RCRD: CPT | Performed by: STUDENT IN AN ORGANIZED HEALTH CARE EDUCATION/TRAINING PROGRAM

## 2025-01-08 PROCEDURE — 1170F FXNL STATUS ASSESSED: CPT | Performed by: STUDENT IN AN ORGANIZED HEALTH CARE EDUCATION/TRAINING PROGRAM

## 2025-01-08 PROCEDURE — 99214 OFFICE O/P EST MOD 30 MIN: CPT | Performed by: STUDENT IN AN ORGANIZED HEALTH CARE EDUCATION/TRAINING PROGRAM

## 2025-01-08 PROCEDURE — 1159F MED LIST DOCD IN RCRD: CPT | Performed by: STUDENT IN AN ORGANIZED HEALTH CARE EDUCATION/TRAINING PROGRAM

## 2025-01-08 PROCEDURE — 3075F SYST BP GE 130 - 139MM HG: CPT | Performed by: STUDENT IN AN ORGANIZED HEALTH CARE EDUCATION/TRAINING PROGRAM

## 2025-01-08 PROCEDURE — 99397 PER PM REEVAL EST PAT 65+ YR: CPT | Performed by: STUDENT IN AN ORGANIZED HEALTH CARE EDUCATION/TRAINING PROGRAM

## 2025-01-08 RX ORDER — ALBUTEROL SULFATE 90 UG/1
2 INHALANT RESPIRATORY (INHALATION) EVERY 4 HOURS PRN
Qty: 18 G | Refills: 11 | Status: SHIPPED | OUTPATIENT
Start: 2025-01-08

## 2025-01-08 ASSESSMENT — ACTIVITIES OF DAILY LIVING (ADL)
MANAGING_FINANCES: INDEPENDENT
GROCERY_SHOPPING: INDEPENDENT
DOING_HOUSEWORK: INDEPENDENT
TAKING_MEDICATION: INDEPENDENT
BATHING: INDEPENDENT
DRESSING: INDEPENDENT

## 2025-01-08 ASSESSMENT — ENCOUNTER SYMPTOMS
DEPRESSION: 0
LOSS OF SENSATION IN FEET: 0
OCCASIONAL FEELINGS OF UNSTEADINESS: 0

## 2025-01-08 ASSESSMENT — PATIENT HEALTH QUESTIONNAIRE - PHQ9
1. LITTLE INTEREST OR PLEASURE IN DOING THINGS: NOT AT ALL
SUM OF ALL RESPONSES TO PHQ9 QUESTIONS 1 AND 2: 0
2. FEELING DOWN, DEPRESSED OR HOPELESS: NOT AT ALL

## 2025-01-08 NOTE — ASSESSMENT & PLAN NOTE
Orders:    albuterol (ProAir HFA) 90 mcg/actuation inhaler; Inhale 2 puffs every 4 hours if needed for shortness of breath.    Complete Pulmonary Function Test (Spirometry/DLCO/Lung Volumes); Future    Albumin-Creatinine Ratio, Urine Random; Future

## 2025-01-08 NOTE — ASSESSMENT & PLAN NOTE
Orders:    CBC; Future    Lipid Panel; Future    Comprehensive Metabolic Panel; Future    TSH with reflex to Free T4 if abnormal; Future    Albumin-Creatinine Ratio, Urine Random; Future

## 2025-01-08 NOTE — PROGRESS NOTES
Subjective   Reason for Visit: Nii Colunga is an 79 y.o. female here for a Medicare Wellness visit.               HPI    Patient Care Team:  Christopher D'Amico, DO as PCP - General (Family Medicine)     Review of Systems    Objective   Vitals:  There were no vitals taken for this visit.      Physical Exam    Assessment & Plan  Type 2 diabetes mellitus without complication, with long-term current use of insulin (Multi)    Orders:    CBC; Future    Lipid Panel; Future    Comprehensive Metabolic Panel; Future    TSH with reflex to Free T4 if abnormal; Future    Albumin-Creatinine Ratio, Urine Random; Future    Primary hypertension    Orders:    CBC; Future    Lipid Panel; Future    Comprehensive Metabolic Panel; Future    TSH with reflex to Free T4 if abnormal; Future    Albumin-Creatinine Ratio, Urine Random; Future    Hyperlipidemia LDL goal <100    Orders:    CBC; Future    Lipid Panel; Future    Comprehensive Metabolic Panel; Future    TSH with reflex to Free T4 if abnormal; Future    Albumin-Creatinine Ratio, Urine Random; Future    Stage 3 chronic kidney disease, unspecified whether stage 3a or 3b CKD (Multi)    Orders:    CBC; Future    Lipid Panel; Future    Comprehensive Metabolic Panel; Future    TSH with reflex to Free T4 if abnormal; Future    Albumin-Creatinine Ratio, Urine Random; Future    Gastroesophageal reflux disease, unspecified whether esophagitis present    Orders:    CBC; Future    Lipid Panel; Future    Comprehensive Metabolic Panel; Future    TSH with reflex to Free T4 if abnormal; Future    Albumin-Creatinine Ratio, Urine Random; Future    Esophageal thickening    Orders:    CBC; Future    Lipid Panel; Future    Comprehensive Metabolic Panel; Future    TSH with reflex to Free T4 if abnormal; Future    Albumin-Creatinine Ratio, Urine Random; Future    Thyroid nodule    Orders:    CBC; Future    Lipid Panel; Future    Comprehensive Metabolic Panel; Future    TSH with reflex to Free T4 if  abnormal; Future    Albumin-Creatinine Ratio, Urine Random; Future    Medicare annual wellness visit, subsequent    Orders:    CBC; Future    Lipid Panel; Future    Comprehensive Metabolic Panel; Future    TSH with reflex to Free T4 if abnormal; Future    Albumin-Creatinine Ratio, Urine Random; Future    Healthcare maintenance    Orders:    CBC; Future    Lipid Panel; Future    Comprehensive Metabolic Panel; Future    TSH with reflex to Free T4 if abnormal; Future    Albumin-Creatinine Ratio, Urine Random; Future    Vitamin D deficiency    Orders:    CBC; Future    Lipid Panel; Future    Comprehensive Metabolic Panel; Future    TSH with reflex to Free T4 if abnormal; Future    Vitamin D 25-Hydroxy,Total (for eval of Vitamin D levels); Future    Albumin-Creatinine Ratio, Urine Random; Future    Encounter for screening mammogram for malignant neoplasm of breast    Orders:    CBC; Future    Lipid Panel; Future    Comprehensive Metabolic Panel; Future    TSH with reflex to Free T4 if abnormal; Future    Albumin-Creatinine Ratio, Urine Random; Future    Asymptomatic menopausal state    Orders:    CBC; Future    Lipid Panel; Future    Comprehensive Metabolic Panel; Future    TSH with reflex to Free T4 if abnormal; Future    Albumin-Creatinine Ratio, Urine Random; Future    Mild intermittent asthma without complication (HHS-HCC)    Orders:    albuterol (ProAir HFA) 90 mcg/actuation inhaler; Inhale 2 puffs every 4 hours if needed for shortness of breath.    Complete Pulmonary Function Test (Spirometry/DLCO/Lung Volumes); Future    Albumin-Creatinine Ratio, Urine Random; Future    Stage 3b chronic kidney disease (Multi)    Orders:    Albumin-Creatinine Ratio, Urine Random; Future    Type 2 diabetes mellitus with other specified complication, with long-term current use of insulin    Orders:    Albumin-Creatinine Ratio, Urine Random; Future                79-year-old female presenting for follow-up on multiple concerns,  Medicare annual wellness exam/CPE.  Doing relatively well without any significant new concerns or interval changes.    DMII  Stable, tolerates current regimen well.  Follows with endocrinology.    HTN  Stable, tolerates current regimen well    HLD  Stable, tolerates current regimen well    CKD  Stable    GERD  Stable, tolerates current regimen well    Tinnitus  Has resolved.  Did not follow-up with ENT.    Thyroid nodule  Ultrasound mostly benign.  Did not follow-up with ENT    Esophageal thickening  Saw GI in the interval.  Does not want to get EGD.  States she feels well, and wants no further workup.    12 point ROS reviewed and negative other than as stated in HPI    General: Alert, oriented, pleasant, in no acute distress  HEENT:      Head: normocephalic, atraumatic;      eyes: EOMI, no scleral icterus;   CV: Heart with regular rate and rhythm, normal S1/S2, no murmurs  Lungs: CTAB without wheezing, rhonchi or rales; good respiratory effort, no increased work of breathing  Abdomen: Soft, nontender, nondistended  Extremities: No edema  Neuro: Cranial nerves grossly intact; alert and oriented  Psych: Appropriate mood and affect    # HM  -CBC, CMP, Lipid panel, Vit D, TSH with reflex T4  -Vaccines:       Flu: UTD      Shingrix: UTD      Pneumococcal: UTD      [facility] w[facility] julio césar: Does not want to screen any longer  -Colonoscopy: 06/2024, 5-year screening  -Osteoporosis screening: Declines    #DMII  -6.8, 12/2024  - Currently on Trulicity 4.5 mg weekly glipizide 10 mg twice daily, metformin milligrams twice daily, Lantus  -Ophthalmology UTD  -Podiatry upcoming  - Following with endocrinology  -Albumin    #HTN  - Essentially at goal in office  - Continue amlodipine 10 mg daily, lisinopril-hydrochlorothiazide 20-25 mg daily, metoprolol succinate 50 mg daily  -Repeat CMP    #HLD  -Continue simvastatin 20 mg daily, aspirin 81 mg daily  - Repeat lipid panel    #CKD 3a/b  -Repeat  CMP    #GERD  -Continue pantoprazole 40 mg daily    #Tinnitus  -Has mostly resolved  - Did not follow-up with ENT    #Thyroid nodule  -Ultrasound without any concerning findings  -Did not follow-up with ENT    #espohageal thickening  -EGD still pending, has decided she does not want to undergo any further testing-    #Asthma  -PFTs  - Albuterol as needed, rarely uses    #Endometrial CA?  -Reports remission status post hysterectomy about 15 years ago    Follow-up 6 months, sooner if indicated    Christopher D'Amico, DO

## 2025-01-09 ENCOUNTER — LAB (OUTPATIENT)
Dept: LAB | Facility: LAB | Age: 80
End: 2025-01-09
Payer: MEDICARE

## 2025-01-09 DIAGNOSIS — E11.69 TYPE 2 DIABETES MELLITUS WITH OTHER SPECIFIED COMPLICATION, WITH LONG-TERM CURRENT USE OF INSULIN: ICD-10-CM

## 2025-01-09 DIAGNOSIS — E78.5 HYPERLIPIDEMIA LDL GOAL <100: ICD-10-CM

## 2025-01-09 DIAGNOSIS — E04.1 THYROID NODULE: ICD-10-CM

## 2025-01-09 DIAGNOSIS — Z79.4 TYPE 2 DIABETES MELLITUS WITH OTHER SPECIFIED COMPLICATION, WITH LONG-TERM CURRENT USE OF INSULIN: ICD-10-CM

## 2025-01-09 DIAGNOSIS — E55.9 VITAMIN D DEFICIENCY: ICD-10-CM

## 2025-01-09 DIAGNOSIS — Z00.00 MEDICARE ANNUAL WELLNESS VISIT, SUBSEQUENT: ICD-10-CM

## 2025-01-09 DIAGNOSIS — I10 PRIMARY HYPERTENSION: ICD-10-CM

## 2025-01-09 DIAGNOSIS — Z00.00 HEALTHCARE MAINTENANCE: ICD-10-CM

## 2025-01-09 DIAGNOSIS — E11.9 TYPE 2 DIABETES MELLITUS WITHOUT COMPLICATION, WITH LONG-TERM CURRENT USE OF INSULIN (MULTI): ICD-10-CM

## 2025-01-09 DIAGNOSIS — N18.30 STAGE 3 CHRONIC KIDNEY DISEASE, UNSPECIFIED WHETHER STAGE 3A OR 3B CKD (MULTI): ICD-10-CM

## 2025-01-09 DIAGNOSIS — Z12.31 ENCOUNTER FOR SCREENING MAMMOGRAM FOR MALIGNANT NEOPLASM OF BREAST: ICD-10-CM

## 2025-01-09 DIAGNOSIS — K21.9 GASTROESOPHAGEAL REFLUX DISEASE, UNSPECIFIED WHETHER ESOPHAGITIS PRESENT: ICD-10-CM

## 2025-01-09 DIAGNOSIS — K22.89 ESOPHAGEAL THICKENING: ICD-10-CM

## 2025-01-09 DIAGNOSIS — Z78.0 ASYMPTOMATIC MENOPAUSAL STATE: ICD-10-CM

## 2025-01-09 DIAGNOSIS — J45.20 MILD INTERMITTENT ASTHMA WITHOUT COMPLICATION (HHS-HCC): ICD-10-CM

## 2025-01-09 DIAGNOSIS — N18.32 STAGE 3B CHRONIC KIDNEY DISEASE (MULTI): ICD-10-CM

## 2025-01-09 DIAGNOSIS — Z79.4 TYPE 2 DIABETES MELLITUS WITHOUT COMPLICATION, WITH LONG-TERM CURRENT USE OF INSULIN (MULTI): ICD-10-CM

## 2025-01-09 LAB
25(OH)D3 SERPL-MCNC: 81 NG/ML (ref 30–100)
ALBUMIN SERPL BCP-MCNC: 4.7 G/DL (ref 3.4–5)
ALP SERPL-CCNC: 59 U/L (ref 33–136)
ALT SERPL W P-5'-P-CCNC: 10 U/L (ref 7–45)
ANION GAP SERPL CALC-SCNC: 13 MMOL/L (ref 10–20)
AST SERPL W P-5'-P-CCNC: 13 U/L (ref 9–39)
BILIRUB SERPL-MCNC: 0.4 MG/DL (ref 0–1.2)
BUN SERPL-MCNC: 22 MG/DL (ref 6–23)
CALCIUM SERPL-MCNC: 10.6 MG/DL (ref 8.6–10.6)
CHLORIDE SERPL-SCNC: 100 MMOL/L (ref 98–107)
CHOLEST SERPL-MCNC: 126 MG/DL (ref 0–199)
CHOLESTEROL/HDL RATIO: 2.9
CO2 SERPL-SCNC: 30 MMOL/L (ref 21–32)
CREAT SERPL-MCNC: 1.13 MG/DL (ref 0.5–1.05)
CREAT UR-MCNC: 249.1 MG/DL (ref 20–320)
EGFRCR SERPLBLD CKD-EPI 2021: 50 ML/MIN/1.73M*2
ERYTHROCYTE [DISTWIDTH] IN BLOOD BY AUTOMATED COUNT: 14.6 % (ref 11.5–14.5)
GLUCOSE SERPL-MCNC: 137 MG/DL (ref 74–99)
HCT VFR BLD AUTO: 43.7 % (ref 36–46)
HDLC SERPL-MCNC: 43.9 MG/DL
HGB BLD-MCNC: 13.7 G/DL (ref 12–16)
LDLC SERPL CALC-MCNC: 61 MG/DL
MCH RBC QN AUTO: 27.1 PG (ref 26–34)
MCHC RBC AUTO-ENTMCNC: 31.4 G/DL (ref 32–36)
MCV RBC AUTO: 87 FL (ref 80–100)
MICROALBUMIN UR-MCNC: 21.2 MG/L
MICROALBUMIN/CREAT UR: 8.5 UG/MG CREAT
NON HDL CHOLESTEROL: 82 MG/DL (ref 0–149)
NRBC BLD-RTO: 0 /100 WBCS (ref 0–0)
PLATELET # BLD AUTO: 339 X10*3/UL (ref 150–450)
POTASSIUM SERPL-SCNC: 4 MMOL/L (ref 3.5–5.3)
PROT SERPL-MCNC: 7.3 G/DL (ref 6.4–8.2)
RBC # BLD AUTO: 5.05 X10*6/UL (ref 4–5.2)
SODIUM SERPL-SCNC: 139 MMOL/L (ref 136–145)
TRIGL SERPL-MCNC: 107 MG/DL (ref 0–149)
TSH SERPL-ACNC: 1.39 MIU/L (ref 0.44–3.98)
VLDL: 21 MG/DL (ref 0–40)
WBC # BLD AUTO: 7.5 X10*3/UL (ref 4.4–11.3)

## 2025-01-09 PROCEDURE — 82570 ASSAY OF URINE CREATININE: CPT

## 2025-01-09 PROCEDURE — 82043 UR ALBUMIN QUANTITATIVE: CPT

## 2025-01-09 PROCEDURE — 82306 VITAMIN D 25 HYDROXY: CPT

## 2025-01-09 PROCEDURE — 80061 LIPID PANEL: CPT

## 2025-01-09 PROCEDURE — 85027 COMPLETE CBC AUTOMATED: CPT

## 2025-01-09 PROCEDURE — 84443 ASSAY THYROID STIM HORMONE: CPT

## 2025-01-09 PROCEDURE — 80053 COMPREHEN METABOLIC PANEL: CPT

## 2025-01-13 ENCOUNTER — TELEPHONE (OUTPATIENT)
Dept: PRIMARY CARE | Facility: CLINIC | Age: 80
End: 2025-01-13
Payer: MEDICARE

## 2025-01-13 DIAGNOSIS — E11.65 TYPE 2 DIABETES MELLITUS WITH HYPERGLYCEMIA, UNSPECIFIED WHETHER LONG TERM INSULIN USE (MULTI): ICD-10-CM

## 2025-01-13 RX ORDER — DULAGLUTIDE 4.5 MG/.5ML
4.5 INJECTION, SOLUTION SUBCUTANEOUS
Qty: 6 ML | Refills: 1 | Status: SHIPPED | OUTPATIENT
Start: 2025-01-13

## 2025-01-13 NOTE — TELEPHONE ENCOUNTER
----- Message from Christopher D'Amico sent at 1/10/2025 10:02 AM EST -----  Chronic kidney disease stable over the past 2 years.  Likely secondary to diabetes and hypertension.  Important to keep blood pressure and blood sugar under control.  Only medication adjustment I would consider is adding Jardiance or Farxiga to diabetic regimen, potentially reducing glipizide or Lantus.  I recommend patient discusses this with her endocrinologist.    Remaining labs essentially unremarkable.

## 2025-01-13 NOTE — TELEPHONE ENCOUNTER
Result Communication    Resulted Orders   CBC   Result Value Ref Range    WBC 7.5 4.4 - 11.3 x10*3/uL    nRBC 0.0 0.0 - 0.0 /100 WBCs    RBC 5.05 4.00 - 5.20 x10*6/uL    Hemoglobin 13.7 12.0 - 16.0 g/dL    Hematocrit 43.7 36.0 - 46.0 %    MCV 87 80 - 100 fL    MCH 27.1 26.0 - 34.0 pg    MCHC 31.4 (L) 32.0 - 36.0 g/dL    RDW 14.6 (H) 11.5 - 14.5 %    Platelets 339 150 - 450 x10*3/uL   Lipid Panel   Result Value Ref Range    Cholesterol 126 0 - 199 mg/dL      Comment:            Age      Desirable   Borderline High   High     0-19 Y     0 - 169       170 - 199     >/= 200    20-24 Y     0 - 189       190 - 224     >/= 225         >24 Y     0 - 199       200 - 239     >/= 240   **All ranges are based on fasting samples. Specific   therapeutic targets will vary based on patient-specific   cardiac risk.    Pediatric guidelines reference:Pediatrics 2011, 128(S5).Adult guidelines reference: NCEP ATPIII Guidelines,SHAHBAZ 2001, 258:2486-97    Venipuncture immediately after or during the administration of Metamizole may lead to falsely low results. Testing should be performed immediately prior to Metamizole dosing.    HDL-Cholesterol 43.9 mg/dL    Cholesterol/HDL Ratio 2.9       Comment:        Ref Values  Desirable  < 3.4  High Risk  > 5.0    LDL Calculated 61 mg/dL      Comment:                                  Near   Borderline      AGE      Desirable  Optimal    High     High     Very High     0-19 Y     0 - 109     ---    110-129   >/= 130     ----    20-24 Y     0 - 119     ---    120-159   >/= 160     ----      >24 Y     0 -  99   100-129  130-159   160-189     >/=190      VLDL 21 0 - 40 mg/dL    Triglycerides 107 0 - 149 mg/dL      Comment:      Age              Desirable        Borderline         High        Very High  SEX:B           mg/dL             mg/dL               mg/dL      mg/dL  <=14D                       ----               ----        ----  15D-365D                    ----               ----         ----  1Y-9Y           0-74               75-99             >=100       ----  10Y-19Y        0-89                            >=130       ----  20Y-24Y        0-114             115-149             >=150      ----  >= 25Y         0-149             150-199             200-499    >=500      Venipuncture immediately after or during the administration of Metamizole may lead to falsely low results. Testing should be performed immediately prior to Metamizole dosing.    Non HDL Cholesterol 82 0 - 149 mg/dL      Comment:            Age       Desirable   Borderline High   High     Very High     0-19 Y     0 - 119       120 - 144     >/= 145    >/= 160    20-24 Y     0 - 149       150 - 189     >/= 190      ----         >24 Y    30 mg/dL above LDL Cholesterol goal     Comprehensive Metabolic Panel   Result Value Ref Range    Glucose 137 (H) 74 - 99 mg/dL    Sodium 139 136 - 145 mmol/L    Potassium 4.0 3.5 - 5.3 mmol/L    Chloride 100 98 - 107 mmol/L    Bicarbonate 30 21 - 32 mmol/L    Anion Gap 13 10 - 20 mmol/L    Urea Nitrogen 22 6 - 23 mg/dL    Creatinine 1.13 (H) 0.50 - 1.05 mg/dL    eGFR 50 (L) >60 mL/min/1.73m*2      Comment:      Calculations of estimated GFR are performed using the 2021 CKD-EPI Study Refit equation without the race variable for the IDMS-Traceable creatinine methods.  https://jasn.asnjournals.org/content/early/2021/09/22/ASN.7794803462    Calcium 10.6 8.6 - 10.6 mg/dL    Albumin 4.7 3.4 - 5.0 g/dL    Alkaline Phosphatase 59 33 - 136 U/L    Total Protein 7.3 6.4 - 8.2 g/dL    AST 13 9 - 39 U/L    Bilirubin, Total 0.4 0.0 - 1.2 mg/dL    ALT 10 7 - 45 U/L      Comment:      Patients treated with Sulfasalazine may generate falsely decreased results for ALT.   TSH with reflex to Free T4 if abnormal   Result Value Ref Range    Thyroid Stimulating Hormone 1.39 0.44 - 3.98 mIU/L    Narrative    TSH testing is performed using different testing methodology at [facility] than at other system  [facility]. Direct result comparisons should only be made within the same method.     Vitamin D 25-Hydroxy,Total (for eval of Vitamin D levels)   Result Value Ref Range    Vitamin D, 25-Hydroxy, Total 81 30 - 100 ng/mL    Narrative    Deficiency:         < 20   ng/ml  Insufficiency:      20-29  ng/ml  Sufficiency:         ng/ml  This assay accurately quantifies the sum of Vitamin D3, 25-Hydroxy and Vitamin D2,25-Hydroxy.   Albumin-Creatinine Ratio, Urine Random   Result Value Ref Range    Albumin, Urine Random 21.2 Not established mg/L    Creatinine, Urine Random 249.1 20.0 - 320.0 mg/dL    Albumin/Creatinine Ratio 8.5 <30.0 ug/mg Creat       11:39 AM      Results were not successfully communicated with the patient and they did not acknowledge their understanding.

## 2025-01-26 DIAGNOSIS — Z79.4 TYPE 2 DIABETES MELLITUS WITHOUT COMPLICATION, WITH LONG-TERM CURRENT USE OF INSULIN (MULTI): ICD-10-CM

## 2025-01-26 DIAGNOSIS — E11.9 TYPE 2 DIABETES MELLITUS WITHOUT COMPLICATION, WITH LONG-TERM CURRENT USE OF INSULIN (MULTI): ICD-10-CM

## 2025-01-26 DIAGNOSIS — K21.9 GASTROESOPHAGEAL REFLUX DISEASE, UNSPECIFIED WHETHER ESOPHAGITIS PRESENT: ICD-10-CM

## 2025-01-27 DIAGNOSIS — I10 PRIMARY HYPERTENSION: ICD-10-CM

## 2025-01-29 DIAGNOSIS — E11.9 TYPE 2 DIABETES MELLITUS WITHOUT COMPLICATION, WITH LONG-TERM CURRENT USE OF INSULIN (MULTI): ICD-10-CM

## 2025-01-29 DIAGNOSIS — I10 PRIMARY HYPERTENSION: ICD-10-CM

## 2025-01-29 DIAGNOSIS — Z79.4 TYPE 2 DIABETES MELLITUS WITHOUT COMPLICATION, WITH LONG-TERM CURRENT USE OF INSULIN (MULTI): ICD-10-CM

## 2025-01-29 RX ORDER — METOPROLOL SUCCINATE 50 MG/1
50 TABLET, EXTENDED RELEASE ORAL DAILY
Qty: 90 TABLET | Refills: 0 | OUTPATIENT
Start: 2025-01-29

## 2025-01-29 RX ORDER — GLIPIZIDE 10 MG/1
10 TABLET ORAL
Qty: 180 TABLET | Refills: 0 | OUTPATIENT
Start: 2025-01-29

## 2025-01-30 ENCOUNTER — APPOINTMENT (OUTPATIENT)
Dept: PRIMARY CARE | Facility: CLINIC | Age: 80
End: 2025-01-30
Payer: MEDICARE

## 2025-01-31 RX ORDER — METOPROLOL SUCCINATE 50 MG/1
50 TABLET, EXTENDED RELEASE ORAL DAILY
Qty: 90 TABLET | Refills: 1 | Status: SHIPPED | OUTPATIENT
Start: 2025-01-31

## 2025-01-31 RX ORDER — PANTOPRAZOLE SODIUM 40 MG/1
40 TABLET, DELAYED RELEASE ORAL DAILY
Qty: 90 TABLET | Refills: 1 | Status: SHIPPED | OUTPATIENT
Start: 2025-01-31

## 2025-01-31 RX ORDER — GLIPIZIDE 10 MG/1
10 TABLET ORAL
Qty: 180 TABLET | Refills: 1 | Status: SHIPPED | OUTPATIENT
Start: 2025-01-31

## 2025-02-18 ENCOUNTER — HOSPITAL ENCOUNTER (OUTPATIENT)
Dept: RESPIRATORY THERAPY | Facility: HOSPITAL | Age: 80
Discharge: HOME | End: 2025-02-18
Payer: MEDICARE

## 2025-02-18 DIAGNOSIS — J45.20 MILD INTERMITTENT ASTHMA WITHOUT COMPLICATION (HHS-HCC): ICD-10-CM

## 2025-02-18 PROCEDURE — 94726 PLETHYSMOGRAPHY LUNG VOLUMES: CPT

## 2025-02-18 PROCEDURE — 94060 EVALUATION OF WHEEZING: CPT | Performed by: STUDENT IN AN ORGANIZED HEALTH CARE EDUCATION/TRAINING PROGRAM

## 2025-02-18 PROCEDURE — 94729 DIFFUSING CAPACITY: CPT | Performed by: STUDENT IN AN ORGANIZED HEALTH CARE EDUCATION/TRAINING PROGRAM

## 2025-02-18 PROCEDURE — 94726 PLETHYSMOGRAPHY LUNG VOLUMES: CPT | Performed by: STUDENT IN AN ORGANIZED HEALTH CARE EDUCATION/TRAINING PROGRAM

## 2025-02-19 LAB
MGC ASCENT PFT - FEV1 - POST: 1.9
MGC ASCENT PFT - FEV1 - PRE: 1.29
MGC ASCENT PFT - FEV1 - PREDICTED: 1.74
MGC ASCENT PFT - FVC - POST: 2.26
MGC ASCENT PFT - FVC - PRE: 2.16
MGC ASCENT PFT - FVC - PREDICTED: 2.26

## 2025-02-21 NOTE — RESULT ENCOUNTER NOTE
Pulmonary function testing does show early obstructive pattern, very good response to bronchodilator, given current status, can continue with albuterol as needed.

## 2025-02-24 DIAGNOSIS — I10 HYPERTENSION, UNSPECIFIED TYPE: ICD-10-CM

## 2025-02-24 DIAGNOSIS — E78.5 HYPERLIPIDEMIA LDL GOAL <100: ICD-10-CM

## 2025-02-24 RX ORDER — LISINOPRIL AND HYDROCHLOROTHIAZIDE 20; 25 MG/1; MG/1
1 TABLET ORAL DAILY
Qty: 90 TABLET | Refills: 1 | Status: SHIPPED | OUTPATIENT
Start: 2025-02-24 | End: 2025-08-23

## 2025-02-24 RX ORDER — SIMVASTATIN 20 MG/1
20 TABLET, FILM COATED ORAL DAILY
Qty: 90 TABLET | Refills: 1 | Status: SHIPPED | OUTPATIENT
Start: 2025-02-24

## 2025-02-26 DIAGNOSIS — I10 PRIMARY HYPERTENSION: ICD-10-CM

## 2025-02-27 ENCOUNTER — TELEPHONE (OUTPATIENT)
Dept: PRIMARY CARE | Facility: CLINIC | Age: 80
End: 2025-02-27
Payer: MEDICARE

## 2025-02-27 RX ORDER — AMLODIPINE BESYLATE 10 MG/1
10 TABLET ORAL DAILY
Qty: 90 TABLET | Refills: 1 | Status: SHIPPED | OUTPATIENT
Start: 2025-02-27

## 2025-02-27 NOTE — TELEPHONE ENCOUNTER
----- Message from Christopher D'Amico sent at 2/21/2025  9:31 AM EST -----  Pulmonary function testing does show early obstructive pattern, very good response to bronchodilator, given current status, can continue with albuterol as needed.

## 2025-02-27 NOTE — TELEPHONE ENCOUNTER
Result Communication    Resulted Orders   Complete Pulmonary Function Test (Spirometry/DLCO/Lung Volumes)   Result Value Ref Range    FVC - Predicted 2.26     FEV1 - Predicted 1.74     FVC - PRE 2.16     FEV1 - Pre 1.29     FVC - Post 2.26     FEV1 - Post 1.90     Narrative    Spirometry suggests early obstructive pattern or dysanapsis, which may be a normal variant. There is a significant improvement in both FEV1 following bronchodilator administration. Lung volumes indicate a mild restrictive defect. Uncorrected DLCO is within normal limits.       9:43 AM      Results were not successfully communicated with the patient and they did not acknowledge their understanding.

## 2025-03-06 DIAGNOSIS — E11.65 TYPE 2 DIABETES MELLITUS WITH HYPERGLYCEMIA, UNSPECIFIED WHETHER LONG TERM INSULIN USE (MULTI): ICD-10-CM

## 2025-03-06 RX ORDER — INSULIN GLARGINE 100 [IU]/ML
INJECTION, SOLUTION SUBCUTANEOUS
Qty: 15 ML | Refills: 1 | Status: SHIPPED | OUTPATIENT
Start: 2025-03-06

## 2025-04-10 ENCOUNTER — CLINICAL SUPPORT (OUTPATIENT)
Dept: AUDIOLOGY | Facility: CLINIC | Age: 80
End: 2025-04-10
Payer: MEDICARE

## 2025-04-10 ENCOUNTER — APPOINTMENT (OUTPATIENT)
Dept: OTOLARYNGOLOGY | Facility: CLINIC | Age: 80
End: 2025-04-10
Payer: MEDICARE

## 2025-04-10 VITALS — HEIGHT: 62 IN | BODY MASS INDEX: 30.73 KG/M2 | WEIGHT: 167 LBS

## 2025-04-10 DIAGNOSIS — H93.19 UNILATERAL SUBJECTIVE NONPULSATILE TINNITUS WITHOUT HEARING LOSS, OTOSCOPIC FINDING, NEUROLOGIC DEFICIT, OR HEAD TRAUMA: ICD-10-CM

## 2025-04-10 DIAGNOSIS — H90.3 SENSORINEURAL HEARING LOSS (SNHL) OF BOTH EARS: Primary | ICD-10-CM

## 2025-04-10 DIAGNOSIS — H61.23 BILATERAL IMPACTED CERUMEN: ICD-10-CM

## 2025-04-10 PROCEDURE — 1159F MED LIST DOCD IN RCRD: CPT | Performed by: NURSE PRACTITIONER

## 2025-04-10 PROCEDURE — 99203 OFFICE O/P NEW LOW 30 MIN: CPT | Performed by: NURSE PRACTITIONER

## 2025-04-10 PROCEDURE — 92567 TYMPANOMETRY: CPT | Performed by: AUDIOLOGIST

## 2025-04-10 PROCEDURE — 1036F TOBACCO NON-USER: CPT | Performed by: NURSE PRACTITIONER

## 2025-04-10 PROCEDURE — 1160F RVW MEDS BY RX/DR IN RCRD: CPT | Performed by: NURSE PRACTITIONER

## 2025-04-10 PROCEDURE — 69210 REMOVE IMPACTED EAR WAX UNI: CPT | Performed by: NURSE PRACTITIONER

## 2025-04-10 PROCEDURE — 92557 COMPREHENSIVE HEARING TEST: CPT | Performed by: AUDIOLOGIST

## 2025-04-10 ASSESSMENT — ENCOUNTER SYMPTOMS
FEVER: 0
VOMITING: 0
SHORTNESS OF BREATH: 0
HALLUCINATIONS: 0
TREMORS: 0
WHEEZING: 0
NUMBNESS: 0
ABDOMINAL PAIN: 0
CONFUSION: 0
NECK PAIN: 0
NECK STIFFNESS: 0
NAUSEA: 0
PALPITATIONS: 0
AGITATION: 0
WEAKNESS: 0
COUGH: 0
DIFFICULTY URINATING: 1
FATIGUE: 0
FREQUENCY: 0

## 2025-04-10 NOTE — PROGRESS NOTES
Subjective   Patient ID: Nii Colunga is a 79 y.o. female who presents for nonpulsatile tinnitus.  HPI  Patient with a history of diabetes, asthma, reflux and thyromegaly presents here today for tinnitus.      Patient reports that she experienced tinnitus in both ears a long time ago after introduction of new medication.  After discontinuing the medication, her tinnitus significantly improved and then eventually gone.  She is not experiencing any more tinnitus now.    She denies having any history of chronic ear infection, ear surgery or trauma to the ears.  No family history of ear disease.  No complaints of dizziness or vertigo.  No otalgia or otorrhea.    Audiogram findings showed normal hearing across all frequencies except for 8000 Hz with mild sensorineural hearing loss.  Normal tympanogram bilateral.  Excellent word discrimination bilaterally.        Review of Systems   Constitutional:  Negative for fatigue and fever.   HENT:  Negative for ear discharge, ear pain and tinnitus.    Eyes:  Negative for visual disturbance.   Respiratory:  Negative for cough, shortness of breath and wheezing.    Cardiovascular:  Negative for palpitations.   Gastrointestinal:  Negative for abdominal pain, nausea and vomiting.   Endocrine: Negative for cold intolerance and heat intolerance.   Genitourinary:  Positive for difficulty urinating. Negative for frequency.   Musculoskeletal:  Negative for neck pain and neck stiffness.   Neurological:  Negative for tremors, weakness and numbness.   Psychiatric/Behavioral:  Negative for agitation, confusion and hallucinations.        Objective   Physical Exam    CONSTITUTIONAL: No acute distress, normal facial features; No fever; no chills  VOICE: No hoarseness or other audible abnormality  RESPIRATION: Breathing comfortably, no stridor; normal breathing effort  CV: No cyanosis visible on the face and neck area  EYES:Pupils equal and round ; no erythema; conjunctiva clear; sclera  white  NEURO: Alert and oriented, able to raise eyebrows symmetrical bilateral, smile with no facial droop, able to swallow  HEAD AND FACE: Symmetric facial features, no masses or lesions    Right ear examination: External ear normal.  EAC with moderate amount of cerumen.  TM partially visualized appears to be intact with no effusion, retraction or perforation.  Left ear examination: External ear normal.  EAC with moderate amount of cerumen.  TM partially visualized appears to be intact with no effusion, retraction or perforation.    NOSE: External nose midline; inferior nasal turbinates normal no mucopus or polyps.  ORAL CAVITY: No lesions of external lips; uvula is midline; tongue with good mobility; no gross mass in oral cavity; mucosa appears pink   NECK/LYMPH: No obvious deformity or lesions; trachea is midline  PSYCH: Alert and oriented with appropriate mood and affect.    Patient ID: Nii Colunga is a 79 y.o. female.    Procedures    Cerumen removal    Consent:  The planned procedure is discussed including possible risk, benefits and alternative treatments reviewed.  Verbal consent is obtained.    Indications:Obstructed cerumen is noted affecting hearing and causing discomfort.    Procedure: The ears are examined microscopically.  Using speculum, alligator, #7suction the obstructive cerumen in both the ears were removed.    Findings: Cerumen and epithelial debris obstruction in both external auditory canals.  Inspection of tympanic membrane after cleaning showed intact with no effusion, retraction or perforation.    Post procedure: The patient tolerated the procedure well without complications       Assessment/Plan       1. Sensorineural hearing loss (SNHL) of both ears        2. Unilateral subjective nonpulsatile tinnitus without hearing loss, otoscopic finding, neurologic deficit, or head trauma  Referral to ENT      3. Bilateral impacted cerumen          Bilateral ears were cleaned today using microscope  and instruments.  Patient tolerated procedure well.  She does have very mild hearing loss in the highest frequencies and she is not a candidate for hearing aid at this time.  Per patient the tinnitus has resolved and this is no longer an issue.  She may follow-up in 1 year with repeat hearing test and repeat ear cleaning.         RONNA Garcia-CNP 04/10/25 10:52 AM

## 2025-04-10 NOTE — PATIENT INSTRUCTIONS
Repeat hearing test in 1 year.  You have good hearing and you are not a candidate for hearing aids at this time.

## 2025-04-10 NOTE — PROGRESS NOTES
Chief Complaint   Patient presents with    Hearing Loss    Cerumen Impaction         HISTORY:  Nii Colunga, age 79 years, was seen for audiogram in conjunction with otolaryngology appointment on 4/10/2025.  Ms. Colunga reports her PCP recommended today's appointments due to concerns for hearing loss.  Ms. Colunga denies hearing loss but does report a history of tinnitus in the past.  The cause of the tinnitus was determined to have been a medication interaction.  The tinnitus stopped once the medication was changed.  There is no ear pain, ear pressure, middle ear pathology, ear surgery, or dizziness reported.    RESULTS:  Prior to testing both external auditory canals contained cerumen    Immittance and acoustic reflexes:  Immittance testing yielded TYPE A tympanograms indicating normal middle ear function both ears  Acoustic reflexes were not tested due to cerumen    Audiogram:  Normal hearing levels were obtained 125 - 4000 Hz with a mild loss at 8000 Hz both ears  Speech reception thresholds obtained at 20 dBHL both ears  Speech discrimination scores were 100% at 50 dBHL    IMPRESSIONS:  Cerumen both ears  Mild loss at 8000 Hz both ears    RECOMMENDATIONS:  1.  Follow up with otolaryngology  2.  Retest hearing levels annually    time: 1035 - 1050

## 2025-04-14 ENCOUNTER — APPOINTMENT (OUTPATIENT)
Dept: PODIATRY | Facility: HOSPITAL | Age: 80
End: 2025-04-14
Payer: MEDICARE

## 2025-04-15 LAB
ALBUMIN/CREAT UR: 4 MG/G CREAT
CHOLEST SERPL-MCNC: 129 MG/DL
CHOLEST/HDLC SERPL: 2.7 (CALC)
CREAT UR-MCNC: 384 MG/DL (ref 20–275)
EST. AVERAGE GLUCOSE BLD GHB EST-MCNC: 143 MG/DL
EST. AVERAGE GLUCOSE BLD GHB EST-SCNC: 7.9 MMOL/L
HBA1C MFR BLD: 6.6 %
HDLC SERPL-MCNC: 48 MG/DL
LDLC SERPL CALC-MCNC: 65 MG/DL (CALC)
MICROALBUMIN UR-MCNC: 1.4 MG/DL
NONHDLC SERPL-MCNC: 81 MG/DL (CALC)
TRIGL SERPL-MCNC: 84 MG/DL

## 2025-04-15 NOTE — PROGRESS NOTES
FUV for diabetes. LV with me 2024.    History of Present Illness  79 y.o. female with H/O type 2 diabetes, HTN, HLD.     Dx:   HbA1c: 6.6% (2025), 6.8% (2024), 7.5% (2024), 6.5% (2024), 6.7% (2023), 8.1% (2023), 8.2% (2023), 6.9% (2022), 7.7% (2022), 8.2% (2022), 8.5% (2021), 7.8% (2021)  Current regimen: glipizide 10 mg BID, metformin 1g BID, Lantus 15 units QHS, Trulicity 4.5 mg/week  Past medications: Januvia  Complications:none  Comorbidities: HTN, HLD     SMBG: twice day  Fasting: low 130s-160s  Bedtime: mid 100s (rarely checks)     Hypoglycemia: no    SMB-2 times per day    Diet: low-carb , Usually 2 meals / day (breakfast and dinner)   Sometimes she skips a meal and drinks Glucerna   Snacks all day: nuts, chips, ice cream bar  Loves potatoes  Takes coffee with cream usually once a day, diet soda  Drinks juice very occasionally.      Exercise: 1 hour on the bike every other day    TODAY:  -overall, doing well  -had fall at the lab on ; hit her buttocks, did not hit head, no fractures    ROS  General: no fever or chills  CV: no chest pain   Respiratory: no shortness of breath  MSK: no lower extremity edema  Neuro: no headache or dizziness  See HPI for Endocrine ROS    Past Medical History:   Diagnosis Date    Disorder of thyroid, unspecified 2016    Thyroid mass    Dry eye syndrome of bilateral lacrimal glands 2019    Bilateral dry eyes    Dry eye syndrome of bilateral lacrimal glands 2019    Bilateral dry eyes    Dry eye syndrome of bilateral lacrimal glands 2019    Bilateral dry eyes    Essential (primary) hypertension 2022    Hypertension    Personal history of malignant neoplasm of other parts of uterus 2016    History of cancer of uterus    Personal history of other endocrine, nutritional and metabolic disease     History of diabetes mellitus    Preglaucoma, unspecified, bilateral 2019    Glaucoma  suspect of both eyes    Preglaucoma, unspecified, 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    Glaucoma suspect of both eyes    Preglaucoma, unspecified, 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    Glaucoma suspect of both eyes    Ulcerative blepharitis unspecified eye, 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/13/2019    Blepharitis, ulcerative    Unspecified asthma, uncomplicated ([facility]) 08/25/2016    Asthma    Vitreous degeneration, 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    Bilateral vitreous detachment    Vitreous degeneration, 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    Bilateral vitreous detachment    Vitreous degeneration, 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    Bilateral vitreous detachment       Past Surgical History:   Procedure Laterality Date    HYSTERECTOMY  02/24/2016    Hysterectomy    THYROID SURGERY  04/05/2016    Thyroid Surgery    TUBAL LIGATION  01/13/2016    Tubal Ligation       Social History     Socioeconomic History    Marital status:      Spouse name: Not on file    Number of children: Not on file    Years of education: Not on file    Highest education level: Not on file   Occupational History    Not on file   Tobacco Use    Smoking status: Never     Passive exposure: Never    Smokeless tobacco: Never   Substance and Sexual Activity    Alcohol use: Never    Drug use: Never    Sexual activity: Not on file   Other Topics Concern    Not on file   Social History Narrative    Not on file     Social Drivers of Health     Financial Resource Strain: Not on file   Food Insecurity: Not on file   Transportation Needs: Not on file   Physical Activity: Not on file   Stress: Not on file   Social Connections: Not on file   Intimate Partner Violence: Not on file   Housing Stability: Not on file       Physical Exam   weight is 77.1 kg (170 lb). Her blood pressure is 164/77 and her pulse is 86.   General: not in acute distress  HEENT: JAYESH HERNANDEZ  Thyroid: no goiter  Neuro: alert and oriented x 3    Current Outpatient Medications   Medication Sig Dispense Refill    acetaminophen (Tylenol Extra Strength) 500 mg  "tablet TAKE 1 TABLET EVERY 4 TO 6 HOURS AS NEEDED.      albuterol (ProAir HFA) 90 mcg/actuation inhaler Inhale 2 puffs every 4 hours if needed for shortness of breath. 18 g 11    amLODIPine (Norvasc) 10 mg tablet Take 1 tablet (10 mg) by mouth once daily. 90 tablet 1    aspirin 81 mg EC tablet Take 1 tablet (81 mg) by mouth once daily.      blood sugar diagnostic strip Use to check blood sugar 3 times per day 300 each 0    dulaglutide (Trulicity) 4.5 mg/0.5 mL pen injector INJECT 4.5MG (0.5ML) UNDER THE SKIN ONCE A WEEK 6 mL 1    glipiZIDE (Glucotrol) 10 mg tablet Take 1 tablet (10 mg) by mouth 2 times a day before meals. 180 tablet 1    lancets (OneTouch Delica Lancets) 33 gauge misc 1 each 3 times a day.      Lantus Solostar U-100 Insulin 100 unit/mL (3 mL) pen INJECT 15 UNITS SUBCUTANEOUSLY AT BEDTIME 15 mL 1    latanoprost (Xalatan) 0.005 % ophthalmic solution INSTILL 1 DROP INTO EACH EYE AT BEDTIME 9 mL 3    lisinopriL-hydrochlorothiazide 20-25 mg tablet Take 1 tablet by mouth once daily 90 tablet 1    metFORMIN (Glucophage) 1,000 mg tablet Take 1 tablet (1,000 mg) by mouth 2 times daily (morning and late afternoon). 180 tablet 1    metoprolol succinate XL (Toprol-XL) 50 mg 24 hr tablet Take 1 tablet (50 mg) by mouth once daily. Do not crush or chew. 90 tablet 1    multivitamin tablet Take 1 tablet by mouth once daily.      pantoprazole (ProtoNix) 40 mg EC tablet TAKE 1 TABLET BY MOUTH ONCE DAILY .  DO  NOT  CRUSH,CHEW  OR  SPLIT 90 tablet 1    pen needle, diabetic (BD Ultra-Fine Terri Pen Needle) 32 gauge x 5/32\" needle To use with insulin injections once a day 100 each 3    simvastatin (Zocor) 20 mg tablet Take 1 tablet by mouth once daily 90 tablet 1     No current facility-administered medications for this visit.       Assessment and Plan  79 y.o. female with hx of T2DM, HTN, HLD, here for follow-up of T2DM.     1. Type 2 diabetes mellitus  2. Hypertension  3. Hyperlipidemia  HbA1c: 6.6% (04/2025), 6.8% " (12/16/2024), 7.5% (8/7/2024), 6.5% (01/2024), 6.7% (12/20/2023), 8.1% (5/18/2023), 8.2% (1/16/23), 6.9% (8/24/2022), 7.7% (5/18/2022), 8.2% (02/11/2022), 8.5% (9/15/2021), 7.8% (02/2021)  Current regimen: glipizide 10 mg BID, metformin 1g BID, Basaglar 15 units QHS, Trulicity 4.5 mg/week  Eye exam: no DR (10/2024)  Urine microalbumin: 4 ug/mg (04/2025) on ACE-I  Podiatry: due  Lipids: HDL 48, LDL 65, TG 84 (41/2025) on simvastatin 20 mg     A1c: 6.6%!  Continues to do very well.  No changes to regimen needed today.    Interested in CGM. Will reach out to DME for Sierra 3 plus with reader.  Reviewed a video with her on how to apply the sensor and start the reader. Advised that if she needs help, she can stop by my office.    Had a podiatry appointment on 4/14 but when she went, she was told that the provider does not trim nails, so it had to be canceled. She will reschedule with a different provider.    Receiving Trulicity and Basaglar from [facility].    PLAN:  -continue [id number] units at bedtime  -continue Trulicity 4.5 mg/week  -continue glipizide, metformin  -continue to check blood sugars twice a day (fasting and bedtime)  -due for podiatry (reminded patient to schedule)    Follow-up in 5 months with Dr. Ash ([facility])

## 2025-04-21 ENCOUNTER — APPOINTMENT (OUTPATIENT)
Dept: ENDOCRINOLOGY | Facility: CLINIC | Age: 80
End: 2025-04-21
Payer: MEDICARE

## 2025-04-21 ENCOUNTER — TELEPHONE (OUTPATIENT)
Dept: ENDOCRINOLOGY | Facility: CLINIC | Age: 80
End: 2025-04-21

## 2025-04-21 VITALS
WEIGHT: 170 LBS | BODY MASS INDEX: 31.09 KG/M2 | HEART RATE: 86 BPM | SYSTOLIC BLOOD PRESSURE: 164 MMHG | DIASTOLIC BLOOD PRESSURE: 77 MMHG

## 2025-04-21 DIAGNOSIS — E11.65 TYPE 2 DIABETES MELLITUS WITH HYPERGLYCEMIA, UNSPECIFIED WHETHER LONG TERM INSULIN USE (MULTI): ICD-10-CM

## 2025-04-21 PROCEDURE — 1036F TOBACCO NON-USER: CPT | Performed by: STUDENT IN AN ORGANIZED HEALTH CARE EDUCATION/TRAINING PROGRAM

## 2025-04-21 PROCEDURE — G2211 COMPLEX E/M VISIT ADD ON: HCPCS | Performed by: STUDENT IN AN ORGANIZED HEALTH CARE EDUCATION/TRAINING PROGRAM

## 2025-04-21 PROCEDURE — 3077F SYST BP >= 140 MM HG: CPT | Performed by: STUDENT IN AN ORGANIZED HEALTH CARE EDUCATION/TRAINING PROGRAM

## 2025-04-21 PROCEDURE — 1159F MED LIST DOCD IN RCRD: CPT | Performed by: STUDENT IN AN ORGANIZED HEALTH CARE EDUCATION/TRAINING PROGRAM

## 2025-04-21 PROCEDURE — 3078F DIAST BP <80 MM HG: CPT | Performed by: STUDENT IN AN ORGANIZED HEALTH CARE EDUCATION/TRAINING PROGRAM

## 2025-04-21 PROCEDURE — 99214 OFFICE O/P EST MOD 30 MIN: CPT | Performed by: STUDENT IN AN ORGANIZED HEALTH CARE EDUCATION/TRAINING PROGRAM

## 2025-04-21 RX ORDER — INSULIN GLARGINE 100 [IU]/ML
15 INJECTION, SOLUTION SUBCUTANEOUS NIGHTLY
COMMUNITY

## 2025-04-21 RX ORDER — BLOOD SUGAR DIAGNOSTIC
STRIP MISCELLANEOUS
Qty: 100 STRIP | Refills: 3 | Status: SHIPPED | OUTPATIENT
Start: 2025-04-21

## 2025-04-21 RX ORDER — METFORMIN HYDROCHLORIDE 1000 MG/1
1000 TABLET ORAL
Qty: 180 TABLET | Refills: 3 | Status: SHIPPED | OUTPATIENT
Start: 2025-04-21

## 2025-04-21 RX ORDER — LANCETS 33 GAUGE
EACH MISCELLANEOUS
Qty: 100 EACH | Refills: 3 | Status: SHIPPED | OUTPATIENT
Start: 2025-04-21

## 2025-04-21 RX ORDER — PEN NEEDLE, DIABETIC 30 GX3/16"
NEEDLE, DISPOSABLE MISCELLANEOUS
Qty: 100 EACH | Refills: 3 | Status: SHIPPED | OUTPATIENT
Start: 2025-04-21

## 2025-04-21 ASSESSMENT — ENCOUNTER SYMPTOMS
OCCASIONAL FEELINGS OF UNSTEADINESS: 0
DEPRESSION: 0
LOSS OF SENSATION IN FEET: 0

## 2025-04-21 NOTE — TELEPHONE ENCOUNTER
Initiated order for tiffany 3 plus w/reader CGM through [facility] by sending a staff message to our contact there at [facility]. They will pull notes and any other pertinent information. Our contact will then send the form for us via email to sign electronically.

## 2025-05-01 ENCOUNTER — APPOINTMENT (OUTPATIENT)
Dept: OPHTHALMOLOGY | Facility: CLINIC | Age: 80
End: 2025-05-01
Payer: MEDICARE

## 2025-05-01 DIAGNOSIS — H40.053 BILATERAL OCULAR HYPERTENSION: Primary | ICD-10-CM

## 2025-05-01 DIAGNOSIS — H40.003 GLAUCOMA SUSPECT OF BOTH EYES: ICD-10-CM

## 2025-05-01 PROCEDURE — 92083 EXTENDED VISUAL FIELD XM: CPT | Performed by: OPHTHALMOLOGY

## 2025-05-01 PROCEDURE — 92133 CPTRZD OPH DX IMG PST SGM ON: CPT | Performed by: OPHTHALMOLOGY

## 2025-05-01 PROCEDURE — G2211 COMPLEX E/M VISIT ADD ON: HCPCS | Performed by: OPHTHALMOLOGY

## 2025-05-01 PROCEDURE — 99213 OFFICE O/P EST LOW 20 MIN: CPT | Performed by: OPHTHALMOLOGY

## 2025-05-01 ASSESSMENT — ENCOUNTER SYMPTOMS
NEUROLOGICAL NEGATIVE: 0
CONSTITUTIONAL NEGATIVE: 0
RESPIRATORY NEGATIVE: 0
HEMATOLOGIC/LYMPHATIC NEGATIVE: 0
EYES NEGATIVE: 1
GASTROINTESTINAL NEGATIVE: 0
MUSCULOSKELETAL NEGATIVE: 0
ALLERGIC/IMMUNOLOGIC NEGATIVE: 0
PSYCHIATRIC NEGATIVE: 0
ENDOCRINE NEGATIVE: 0
CARDIOVASCULAR NEGATIVE: 0

## 2025-05-01 ASSESSMENT — EXTERNAL EXAM - RIGHT EYE: OD_EXAM: NORMAL

## 2025-05-01 ASSESSMENT — VISUAL ACUITY
METHOD: SNELLEN - LINEAR
OD_CC: 20/30
CORRECTION_TYPE: GLASSES
OS_CC: 20/30

## 2025-05-01 ASSESSMENT — CONF VISUAL FIELD
OS_INFERIOR_NASAL_RESTRICTION: 0
OS_SUPERIOR_NASAL_RESTRICTION: 0
OD_NORMAL: 1
OS_NORMAL: 1
OS_SUPERIOR_TEMPORAL_RESTRICTION: 0
OD_INFERIOR_NASAL_RESTRICTION: 0
OD_SUPERIOR_TEMPORAL_RESTRICTION: 0
OD_INFERIOR_TEMPORAL_RESTRICTION: 0
OS_INFERIOR_TEMPORAL_RESTRICTION: 0
OD_SUPERIOR_NASAL_RESTRICTION: 0

## 2025-05-01 ASSESSMENT — SLIT LAMP EXAM - LIDS
COMMENTS: NORMAL
COMMENTS: NORMAL

## 2025-05-01 ASSESSMENT — PACHYMETRY
OD_CT(UM): 596
OS_CT(UM): 586

## 2025-05-01 ASSESSMENT — TONOMETRY
IOP_METHOD: GOLDMANN APPLANATION
OS_IOP_MMHG: 18
OD_IOP_MMHG: 19

## 2025-05-01 ASSESSMENT — EXTERNAL EXAM - LEFT EYE: OS_EXAM: NORMAL

## 2025-05-01 NOTE — PROGRESS NOTES
glaucoma suspect, both eyes  ocular htn both eyes  tmax 28/29  no family hx  no trauma  gonio open  pach thick  Bower Visual Field - OU - Both Eyes          Nsd OU, no clear glaucomatous pattern         OCT, Optic Nerve - OU - Both Eyes          Moderate thinning OU from baseline but artifactual scan OS          s/pce/iol/abic OU 6/21 and 7/21 OS and OD  goal <20  IOP is great! There is some concern on oct but it may  be impacted by dry cornea, mild pco  continue latan qhs  RTC 6 months for DFE/RNFL, make sure patient is dilated and instill tears before doing scan

## 2025-05-16 ENCOUNTER — ANCILLARY PROCEDURE (OUTPATIENT)
Dept: URGENT CARE | Age: 80
End: 2025-05-16
Payer: MEDICARE

## 2025-05-16 ENCOUNTER — OFFICE VISIT (OUTPATIENT)
Dept: URGENT CARE | Age: 80
End: 2025-05-16
Payer: MEDICARE

## 2025-05-16 VITALS
SYSTOLIC BLOOD PRESSURE: 176 MMHG | TEMPERATURE: 97.8 F | DIASTOLIC BLOOD PRESSURE: 82 MMHG | WEIGHT: 168 LBS | OXYGEN SATURATION: 97 % | HEIGHT: 62 IN | BODY MASS INDEX: 30.91 KG/M2 | RESPIRATION RATE: 20 BRPM | HEART RATE: 76 BPM

## 2025-05-16 DIAGNOSIS — J45.21 MILD INTERMITTENT ASTHMA WITH EXACERBATION (HHS-HCC): ICD-10-CM

## 2025-05-16 DIAGNOSIS — R05.1 ACUTE COUGH: Primary | ICD-10-CM

## 2025-05-16 DIAGNOSIS — R05.1 ACUTE COUGH: ICD-10-CM

## 2025-05-16 LAB
POC CORONAVIRUS SARS-COV-2 PCR: NEGATIVE
POC HUMAN RHINOVIRUS PCR: NEGATIVE
POC INFLUENZA A VIRUS PCR: NEGATIVE
POC INFLUENZA B VIRUS PCR: NEGATIVE
POC RESPIRATORY SYNCYTIAL VIRUS PCR: NEGATIVE

## 2025-05-16 PROCEDURE — 71046 X-RAY EXAM CHEST 2 VIEWS: CPT | Performed by: PHYSICIAN ASSISTANT

## 2025-05-16 RX ORDER — PREDNISONE 50 MG/1
50 TABLET ORAL DAILY
Qty: 5 TABLET | Refills: 0 | Status: SHIPPED | OUTPATIENT
Start: 2025-05-16 | End: 2025-05-21

## 2025-05-16 RX ORDER — AZITHROMYCIN 250 MG/1
TABLET, FILM COATED ORAL
Qty: 6 TABLET | Refills: 0 | Status: SHIPPED | OUTPATIENT
Start: 2025-05-16

## 2025-05-16 RX ORDER — IPRATROPIUM BROMIDE AND ALBUTEROL SULFATE 2.5; .5 MG/3ML; MG/3ML
9 SOLUTION RESPIRATORY (INHALATION) ONCE
Status: COMPLETED | OUTPATIENT
Start: 2025-05-16 | End: 2025-05-16

## 2025-05-16 RX ORDER — BENZONATATE 200 MG/1
200 CAPSULE ORAL 2 TIMES DAILY
Qty: 10 CAPSULE | Refills: 0 | Status: SHIPPED | OUTPATIENT
Start: 2025-05-16 | End: 2025-05-21

## 2025-05-16 RX ADMIN — IPRATROPIUM BROMIDE AND ALBUTEROL SULFATE 9 ML: 2.5; .5 SOLUTION RESPIRATORY (INHALATION) at 17:02

## 2025-05-16 NOTE — PROGRESS NOTES
"Subjective   Patient ID: Nii Colunga is a 79 y.o. female. They present today with a chief complaint of Cough (Cough and chest congestion with sore throat for almost a week now Pt has asthma).    History of Present Illness  Patient is a 79-year-old female with a history of asthma, diabetes, hypertension, hyperlipidemia who presents for cough and chest congestion for about a week.  Patient states that she has a history of asthma and feels as though this may be flaring up.  She has been using her albuterol inhaler at home which does not seem to be helping.  She notes a cough with wheeze and sputum production.  Denies any chest pain, shortness of breath, lower extremity swelling or hemoptysis.    Past Medical History  Allergies as of 05/16/2025    (No Known Allergies)       Prescriptions Prior to Admission[1]     Medical History[2]    Surgical History[3]     reports that she has never smoked. She has never been exposed to tobacco smoke. She has never used smokeless tobacco. She reports that she does not drink alcohol and does not use drugs.    Review of Systems  ROS is negative unless otherwise stated in HPI.         Objective    Vitals:    05/16/25 1316 05/16/25 1412   BP: 176/82    Pulse: (!) 116 76   Resp: 20    Temp: 36.6 °C (97.8 °F)    SpO2: 97%    Weight: 76.2 kg (168 lb)    Height: 1.575 m (5' 2\")      No LMP recorded. Patient is postmenopausal.      VS: As documented in the triage note and EMR flowsheet from this visit was reviewed  General: Well appearing. No acute distress.   Eyes:  Extraocular movements grossly intact. No scleral icterus.   Head: Atraumatic. Normocephalic.     Neck: No meningismus. No gross masses. Full movement through range of motion  ENT: Posterior oropharynx shows no erythema, exudate or edema.  Uvula is midline without edema.  No stridor or trismus  CV: Regular rhythm. No murmurs, rubs, gallops appreciated.   Resp: Expiratory wheezing heard in all lung fields. No respiratory " distress.    Skin: Warm, dry. No rashes  Neuro: CN II-VII intact. A&O x3. Speech fluent. Alert. Moving all extremities. Ambulates with normal gait  Psych: Appropriate mood and affect for situation      Point of Care Test & Imaging Results from this visit  Results for orders placed or performed in visit on 05/16/25   POCT SPOTFIRE R/ST Panel Mini w/COVID ([facility]) manually resulted    Specimen: Swab   Result Value Ref Range    POC Sars-Cov-2 PCR Negative Negative    POC Respiratory Syncytial Virus PCR Negative Negative    POC Influenza A Virus PCR Negative Negative    POC Influenza B Virus PCR Negative Negative    POC Human Rhinovirus PCR Negative Negative      Imaging  XR chest 2 views  Result Date: 5/16/2025  1.  No acute cardiopulmonary process is evident. 2. Radiopaque structures over the neck for which clinical correlation is recommended.   MACRO: None   Signed by: Manav Lopez 5/16/2025 1:45 PM Dictation workstation:   [id number]      Cardiology, Vascular, and Other Imaging  No other imaging results found for the past 2 days      Diagnostic study results (if any) were reviewed by Joellen Alva PA-C.    Assessment/Plan   Allergies, medications, history, and pertinent labs/EKGs/Imaging reviewed by Joellen Alva PA-C.     Medical Decision Making  Patient is a 79-year-old female with history of asthma who presents for cough, chest congestion and  asthma exacerbation.  On examination, patient is in no acute respiratory distress.  Satting 97% on room air.  Cardiopulmonary examination did reveal expiratory wheezing heard in in all lung fields.  Initial vitals revealed tachycardia at 116.  Spot fire testing was negative.  Chest x-ray shows no evidence of pneumonia.  Patient was administered stacked DuoNeb treatments.  On reassessment, she is feeling markedly improved.  Lung sounds improved.  Heart rate improved to 76.  Will treat for asthma exacerbation with prednisone burst, Z-Jah and benzonatate for  cough. Patient informed of the diagnosis.  They are agreeable to the plan as discussed above.  Patient given the opportunity to ask questions.  All of the patient's questions were answered. Given precautions in which to seek attention in the emergency department. Discussed follow up with PCP or other appropriate clinician.      Orders and Diagnoses  Diagnoses and all orders for this visit:  Acute cough  -     POCT SPOTFIRE R/ST Panel Mini w/COVID ([facility]) manually resulted  -     XR chest 2 views; Future  -     ipratropium-albuteroL (Duo-Neb) 0.5-2.5 mg/3 mL nebulizer solution 9 mL  -     benzonatate (Tessalon) 200 mg capsule; Take 1 capsule (200 mg) by mouth 2 times a day for 5 days. Do not crush or chew.  Mild intermittent asthma with exacerbation ([facility])  -     azithromycin (Zithromax Z-Jah) 250 mg tablet; Follow Z-jah instructions: 500mg on day #1, then 250mg daily for days #2, 3, 4, and 5.  -     predniSONE (Deltasone) 50 mg tablet; Take 1 tablet (50 mg) by mouth once daily for 5 days.      Medical Admin Record      Patient disposition: Home    Electronically signed by Joellen Alva PA-C  2:17 PM           [1] (Not in a hospital admission)   [2]   Past Medical History:  Diagnosis Date    Disorder of thyroid, unspecified 05/25/2016    Thyroid mass    Dry eye syndrome of bilateral lacrimal glands 09/13/2019    Bilateral dry eyes    Dry eye syndrome of bilateral lacrimal glands 09/13/2019    Bilateral dry eyes    Dry eye syndrome of bilateral lacrimal glands 09/13/2019    Bilateral dry eyes    Essential (primary) hypertension 08/23/2022    Hypertension    Personal history of malignant neoplasm of other parts of uterus 01/13/2016    History of cancer of uterus    Personal history of other endocrine, nutritional and metabolic disease     History of diabetes mellitus    Preglaucoma, unspecified, 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    Glaucoma suspect of both eyes    Preglaucoma, unspecified, 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     Glaucoma suspect of both eyes    Preglaucoma, unspecified, 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    Glaucoma suspect of both eyes    Ulcerative blepharitis unspecified eye, 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/13/2019    Blepharitis, ulcerative    Unspecified asthma, uncomplicated ([facility]) 08/25/2016    Asthma    Vitreous degeneration, 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    Bilateral vitreous detachment    Vitreous degeneration, 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    Bilateral vitreous detachment    Vitreous degeneration, 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    Bilateral vitreous detachment   [3]   Past Surgical History:  Procedure Laterality Date    HYSTERECTOMY  02/24/2016    Hysterectomy    THYROID SURGERY  04/05/2016    Thyroid Surgery    TUBAL LIGATION  01/13/2016    Tubal Ligation

## 2025-06-06 DIAGNOSIS — E11.65 TYPE 2 DIABETES MELLITUS WITH HYPERGLYCEMIA, UNSPECIFIED WHETHER LONG TERM INSULIN USE (MULTI): ICD-10-CM

## 2025-06-06 RX ORDER — DULAGLUTIDE 4.5 MG/.5ML
4.5 INJECTION, SOLUTION SUBCUTANEOUS
Qty: 6 ML | Refills: 1 | Status: SHIPPED | OUTPATIENT
Start: 2025-06-08

## 2025-07-08 ENCOUNTER — APPOINTMENT (OUTPATIENT)
Dept: PRIMARY CARE | Facility: CLINIC | Age: 80
End: 2025-07-08
Payer: MEDICARE

## 2025-07-08 VITALS
SYSTOLIC BLOOD PRESSURE: 143 MMHG | BODY MASS INDEX: 30.91 KG/M2 | DIASTOLIC BLOOD PRESSURE: 76 MMHG | WEIGHT: 168 LBS | HEART RATE: 74 BPM | OXYGEN SATURATION: 98 % | HEIGHT: 62 IN

## 2025-07-08 DIAGNOSIS — N18.30 STAGE 3 CHRONIC KIDNEY DISEASE, UNSPECIFIED WHETHER STAGE 3A OR 3B CKD (MULTI): ICD-10-CM

## 2025-07-08 DIAGNOSIS — K21.9 GASTROESOPHAGEAL REFLUX DISEASE, UNSPECIFIED WHETHER ESOPHAGITIS PRESENT: ICD-10-CM

## 2025-07-08 DIAGNOSIS — E11.9 TYPE 2 DIABETES MELLITUS WITHOUT COMPLICATION, WITH LONG-TERM CURRENT USE OF INSULIN: ICD-10-CM

## 2025-07-08 DIAGNOSIS — E78.5 HYPERLIPIDEMIA LDL GOAL <100: ICD-10-CM

## 2025-07-08 DIAGNOSIS — J45.20 MILD INTERMITTENT ASTHMA WITHOUT COMPLICATION (HHS-HCC): ICD-10-CM

## 2025-07-08 DIAGNOSIS — I10 PRIMARY HYPERTENSION: Primary | ICD-10-CM

## 2025-07-08 DIAGNOSIS — Z79.4 TYPE 2 DIABETES MELLITUS WITHOUT COMPLICATION, WITH LONG-TERM CURRENT USE OF INSULIN: ICD-10-CM

## 2025-07-08 PROCEDURE — 99214 OFFICE O/P EST MOD 30 MIN: CPT | Performed by: STUDENT IN AN ORGANIZED HEALTH CARE EDUCATION/TRAINING PROGRAM

## 2025-07-08 PROCEDURE — 1160F RVW MEDS BY RX/DR IN RCRD: CPT | Performed by: STUDENT IN AN ORGANIZED HEALTH CARE EDUCATION/TRAINING PROGRAM

## 2025-07-08 PROCEDURE — 1159F MED LIST DOCD IN RCRD: CPT | Performed by: STUDENT IN AN ORGANIZED HEALTH CARE EDUCATION/TRAINING PROGRAM

## 2025-07-08 PROCEDURE — 3077F SYST BP >= 140 MM HG: CPT | Performed by: STUDENT IN AN ORGANIZED HEALTH CARE EDUCATION/TRAINING PROGRAM

## 2025-07-08 PROCEDURE — G2211 COMPLEX E/M VISIT ADD ON: HCPCS | Performed by: STUDENT IN AN ORGANIZED HEALTH CARE EDUCATION/TRAINING PROGRAM

## 2025-07-08 PROCEDURE — 1036F TOBACCO NON-USER: CPT | Performed by: STUDENT IN AN ORGANIZED HEALTH CARE EDUCATION/TRAINING PROGRAM

## 2025-07-08 PROCEDURE — 3078F DIAST BP <80 MM HG: CPT | Performed by: STUDENT IN AN ORGANIZED HEALTH CARE EDUCATION/TRAINING PROGRAM

## 2025-07-08 RX ORDER — OLMESARTAN MEDOXOMIL AND HYDROCHLOROTHIAZIDE 20/12.5 20; 12.5 MG/1; MG/1
1 TABLET ORAL DAILY
Qty: 30 TABLET | Refills: 2 | Status: SHIPPED | OUTPATIENT
Start: 2025-07-08 | End: 2025-10-06

## 2025-07-08 RX ORDER — IBUPROFEN 600 MG/1
TABLET, FILM COATED ORAL
COMMUNITY
Start: 2025-01-21

## 2025-07-08 ASSESSMENT — PATIENT HEALTH QUESTIONNAIRE - PHQ9
1. LITTLE INTEREST OR PLEASURE IN DOING THINGS: NOT AT ALL
SUM OF ALL RESPONSES TO PHQ9 QUESTIONS 1 AND 2: 0
1. LITTLE INTEREST OR PLEASURE IN DOING THINGS: NOT AT ALL
2. FEELING DOWN, DEPRESSED OR HOPELESS: NOT AT ALL
SUM OF ALL RESPONSES TO PHQ9 QUESTIONS 1 AND 2: 0
2. FEELING DOWN, DEPRESSED OR HOPELESS: NOT AT ALL

## 2025-07-08 ASSESSMENT — COLUMBIA-SUICIDE SEVERITY RATING SCALE - C-SSRS
6. HAVE YOU EVER DONE ANYTHING, STARTED TO DO ANYTHING, OR PREPARED TO DO ANYTHING TO END YOUR LIFE?: NO
2. HAVE YOU ACTUALLY HAD ANY THOUGHTS OF KILLING YOURSELF?: NO
6. HAVE YOU EVER DONE ANYTHING, STARTED TO DO ANYTHING, OR PREPARED TO DO ANYTHING TO END YOUR LIFE?: NO
1. IN THE PAST MONTH, HAVE YOU WISHED YOU WERE DEAD OR WISHED YOU COULD GO TO SLEEP AND NOT WAKE UP?: NO
1. IN THE PAST MONTH, HAVE YOU WISHED YOU WERE DEAD OR WISHED YOU COULD GO TO SLEEP AND NOT WAKE UP?: NO
2. HAVE YOU ACTUALLY HAD ANY THOUGHTS OF KILLING YOURSELF?: NO

## 2025-07-08 ASSESSMENT — ENCOUNTER SYMPTOMS
DEPRESSION: 0
LOSS OF SENSATION IN FEET: 0
OCCASIONAL FEELINGS OF UNSTEADINESS: 0

## 2025-07-08 NOTE — PROGRESS NOTES
79-year-old female presents for follow-up on multiple concerns.  Stable, doing relatively well.  She does report a dry tickle in her throat that causes a dry cough.  She does admit that it is worse at night, and will wake her up throughout the night.  She believes it is related to lisinopril, and denies there being the acid reflux association.    DMII  Stable, tolerates current regimen well.  Follows with endocrinology.    HTN  Stable, has historically been tolerating medicine, other than concerns above.    HLD  Stable, tolerates current regimen well    CKD  Stable    GERD  Stable, tolerates current regimen well    12 point ROS reviewed and negative other than as stated in HPI    General: Alert, oriented, pleasant, in no acute distress  HEENT:      Head: normocephalic, atraumatic;      eyes: EOMI, no scleral icterus;   CV: Heart with regular rate and rhythm, normal S1/S2, no murmurs  Lungs: CTAB without wheezing, rhonchi or rales; good respiratory effort, no increased work of breathing  Abdomen: Soft, nontender, nondistended  Extremities: No edema  Neuro: Cranial nerves grossly intact; alert and oriented  Psych: Appropriate mood and affect    #DMII  - 6.6, 04/2025  - Currently on Trulicity 4.5 mg weekly glipizide 10 mg twice daily, metformin milligrams twice daily, Basaglar 15 units daily  -Ophthalmology UTD  - Podiatry still pending  - Following with endocrinology  -Repeat A1c    #HTN  - Essentially at goal in office  - Currently on amlodipine 10 mg daily, lisinopril-hydrochlorothiazide 20-25 mg daily, metoprolol succinate 50 mg daily  -Did agree to trial changing lisinopril-HCTZ to olmesartan-HCTZ 20-12.5 mg daily  -Repeat CMP in 2 weeks    #HLD  -Continue simvastatin 20 mg daily, aspirin 81 mg daily  - Repeat lipid panel    #CKD 3a/b  -Repeat CMP  -I do think SGLT2 would be warranted, patient does not want to make any changes    #GERD  -Continue pantoprazole 40 mg daily    #Dry cough  -Given that she has been on  lisinopril long-term, and the coughing is worse at night, I do have some suspicion for this being GERD related, but did agree to switch ACE with ARB and trial, patient to follow-up with us regarding efficacy    #Asthma  -PFTs showed early obstructive pattern with very good response to bronchodilator  -Continue albuterol as needed, rarely uses    #Endometrial CA  -Reports remission status post hysterectomy about 15 years ago    Follow-up 6 months, sooner if indicated, Medicare at that time    Christopher D'Amico, DO

## 2025-07-25 LAB
ALBUMIN SERPL-MCNC: 4.5 G/DL (ref 3.6–5.1)
ALP SERPL-CCNC: 56 U/L (ref 37–153)
ALT SERPL-CCNC: 11 U/L (ref 6–29)
ANION GAP SERPL CALCULATED.4IONS-SCNC: 11 MMOL/L (CALC) (ref 7–17)
AST SERPL-CCNC: 13 U/L (ref 10–35)
BILIRUB SERPL-MCNC: 0.4 MG/DL (ref 0.2–1.2)
BUN SERPL-MCNC: 21 MG/DL (ref 7–25)
CALCIUM SERPL-MCNC: 10.6 MG/DL (ref 8.6–10.4)
CHLORIDE SERPL-SCNC: 102 MMOL/L (ref 98–110)
CHOLEST SERPL-MCNC: 111 MG/DL
CHOLEST/HDLC SERPL: 2.2 (CALC)
CO2 SERPL-SCNC: 26 MMOL/L (ref 20–32)
CREAT SERPL-MCNC: 1.18 MG/DL (ref 0.6–1)
EGFRCR SERPLBLD CKD-EPI 2021: 47 ML/MIN/1.73M2
ERYTHROCYTE [DISTWIDTH] IN BLOOD BY AUTOMATED COUNT: 13.6 % (ref 11–15)
EST. AVERAGE GLUCOSE BLD GHB EST-MCNC: 154 MG/DL
EST. AVERAGE GLUCOSE BLD GHB EST-SCNC: 8.5 MMOL/L
GLUCOSE SERPL-MCNC: 129 MG/DL (ref 65–99)
HBA1C MFR BLD: 7 %
HCT VFR BLD AUTO: 42.6 % (ref 35–45)
HDLC SERPL-MCNC: 51 MG/DL
HGB BLD-MCNC: 13.3 G/DL (ref 11.7–15.5)
LDLC SERPL CALC-MCNC: 43 MG/DL (CALC)
MCH RBC QN AUTO: 27.9 PG (ref 27–33)
MCHC RBC AUTO-ENTMCNC: 31.2 G/DL (ref 32–36)
MCV RBC AUTO: 89.3 FL (ref 80–100)
NONHDLC SERPL-MCNC: 60 MG/DL (CALC)
PLATELET # BLD AUTO: 338 THOUSAND/UL (ref 140–400)
PMV BLD REES-ECKER: 11.1 FL (ref 7.5–12.5)
POTASSIUM SERPL-SCNC: 4.5 MMOL/L (ref 3.5–5.3)
PROT SERPL-MCNC: 7.1 G/DL (ref 6.1–8.1)
RBC # BLD AUTO: 4.77 MILLION/UL (ref 3.8–5.1)
SODIUM SERPL-SCNC: 139 MMOL/L (ref 135–146)
TRIGL SERPL-MCNC: 86 MG/DL
WBC # BLD AUTO: 6.5 THOUSAND/UL (ref 3.8–10.8)

## 2025-07-26 ENCOUNTER — RESULTS FOLLOW-UP (OUTPATIENT)
Dept: PRIMARY CARE | Facility: CLINIC | Age: 80
End: 2025-07-26
Payer: MEDICARE

## 2025-07-26 NOTE — RESULT ENCOUNTER NOTE
Hemoglobin A1c at 7.0.  Acceptable for age.    GFR 47, creatinine 1.18.  At baseline kidney dysfunction.  I do think she should discuss with her endocrinologist starting medication such as Jardiance or Farxiga.    Calcium slightly elevated at 10.6.  Normal on past labs, will continue to monitor.  Patient does take hydrochlorothiazide, which can sometimes cause this, but like I said, it has been normal on her previous labs.    Remaining labs unremarkable.

## 2025-07-28 DIAGNOSIS — I10 PRIMARY HYPERTENSION: ICD-10-CM

## 2025-07-28 DIAGNOSIS — Z79.4 TYPE 2 DIABETES MELLITUS WITHOUT COMPLICATION, WITH LONG-TERM CURRENT USE OF INSULIN: ICD-10-CM

## 2025-07-28 DIAGNOSIS — E78.5 HYPERLIPIDEMIA LDL GOAL <100: ICD-10-CM

## 2025-07-28 DIAGNOSIS — E11.9 TYPE 2 DIABETES MELLITUS WITHOUT COMPLICATION, WITH LONG-TERM CURRENT USE OF INSULIN: ICD-10-CM

## 2025-07-28 RX ORDER — GLIPIZIDE 10 MG/1
10 TABLET ORAL
Qty: 180 TABLET | Refills: 1 | Status: SHIPPED | OUTPATIENT
Start: 2025-07-28

## 2025-07-28 RX ORDER — SIMVASTATIN 20 MG/1
20 TABLET, FILM COATED ORAL DAILY
Qty: 90 TABLET | Refills: 1 | Status: SHIPPED | OUTPATIENT
Start: 2025-07-28

## 2025-07-28 RX ORDER — METOPROLOL SUCCINATE 50 MG/1
50 TABLET, EXTENDED RELEASE ORAL DAILY
Qty: 90 TABLET | Refills: 1 | Status: SHIPPED | OUTPATIENT
Start: 2025-07-28

## 2025-07-28 NOTE — TELEPHONE ENCOUNTER
LEFT DETAILED MESSAGE ON PATIENT VM----- Message from Christopher D'Amico sent at 7/26/2025  4:38 PM EDT -----  Hemoglobin A1c at 7.0.  Acceptable for age.    GFR 47, creatinine 1.18.  At baseline kidney dysfunction.  I do think she should discuss with her endocrinologist starting medication such as Jardiance or Farxiga.    Calcium slightly elevated at 10.6.  Normal on past labs, will continue to monitor.  Patient does take hydrochlorothiazide, which can sometimes cause this, but like I said, it has been normal on her   previous labs.    Remaining labs unremarkable.  ----- Message -----  From: [facility] Results In  Sent: 7/24/2025   8:30 PM EDT  To: Christopher D'Amico, DO     No

## 2025-08-21 DIAGNOSIS — I10 PRIMARY HYPERTENSION: ICD-10-CM

## 2025-08-21 RX ORDER — AMLODIPINE BESYLATE 10 MG/1
10 TABLET ORAL DAILY
Qty: 100 TABLET | Refills: 1 | Status: SHIPPED | OUTPATIENT
Start: 2025-08-21

## 2025-08-26 ENCOUNTER — OFFICE VISIT (OUTPATIENT)
Dept: PRIMARY CARE | Facility: CLINIC | Age: 80
End: 2025-08-26
Payer: MEDICARE

## 2025-08-26 VITALS
OXYGEN SATURATION: 97 % | BODY MASS INDEX: 31.28 KG/M2 | HEART RATE: 77 BPM | WEIGHT: 170 LBS | SYSTOLIC BLOOD PRESSURE: 132 MMHG | HEIGHT: 62 IN | DIASTOLIC BLOOD PRESSURE: 73 MMHG

## 2025-08-26 DIAGNOSIS — R07.9 CHEST PAIN, UNSPECIFIED TYPE: Primary | ICD-10-CM

## 2025-08-26 PROCEDURE — 1160F RVW MEDS BY RX/DR IN RCRD: CPT | Performed by: STUDENT IN AN ORGANIZED HEALTH CARE EDUCATION/TRAINING PROGRAM

## 2025-08-26 PROCEDURE — 93000 ELECTROCARDIOGRAM COMPLETE: CPT | Performed by: STUDENT IN AN ORGANIZED HEALTH CARE EDUCATION/TRAINING PROGRAM

## 2025-08-26 PROCEDURE — 3075F SYST BP GE 130 - 139MM HG: CPT | Performed by: STUDENT IN AN ORGANIZED HEALTH CARE EDUCATION/TRAINING PROGRAM

## 2025-08-26 PROCEDURE — 3078F DIAST BP <80 MM HG: CPT | Performed by: STUDENT IN AN ORGANIZED HEALTH CARE EDUCATION/TRAINING PROGRAM

## 2025-08-26 PROCEDURE — 99214 OFFICE O/P EST MOD 30 MIN: CPT | Performed by: STUDENT IN AN ORGANIZED HEALTH CARE EDUCATION/TRAINING PROGRAM

## 2025-08-26 PROCEDURE — 1159F MED LIST DOCD IN RCRD: CPT | Performed by: STUDENT IN AN ORGANIZED HEALTH CARE EDUCATION/TRAINING PROGRAM

## 2025-08-26 PROCEDURE — 1036F TOBACCO NON-USER: CPT | Performed by: STUDENT IN AN ORGANIZED HEALTH CARE EDUCATION/TRAINING PROGRAM

## 2025-08-27 ENCOUNTER — OFFICE VISIT (OUTPATIENT)
Dept: CARDIOLOGY | Facility: CLINIC | Age: 80
End: 2025-08-27
Payer: MEDICARE

## 2025-08-27 ASSESSMENT — ENCOUNTER SYMPTOMS
DEPRESSION: 1
OCCASIONAL FEELINGS OF UNSTEADINESS: 0
LOSS OF SENSATION IN FEET: 0

## 2025-08-29 ENCOUNTER — TELEPHONE (OUTPATIENT)
Dept: CARDIOLOGY | Facility: CLINIC | Age: 80
End: 2025-08-29
Payer: MEDICARE

## 2025-08-29 DIAGNOSIS — R06.02 EXERTIONAL SHORTNESS OF BREATH: ICD-10-CM

## 2025-08-29 DIAGNOSIS — E11.9 DIABETES MELLITUS TYPE 2 WITHOUT RETINOPATHY (MULTI): ICD-10-CM

## 2025-08-29 DIAGNOSIS — E78.5 HYPERLIPIDEMIA LDL GOAL <100: ICD-10-CM

## 2025-08-29 DIAGNOSIS — I20.0 UNSTABLE ANGINA (MULTI): Primary | ICD-10-CM

## 2025-08-29 DIAGNOSIS — I10 HYPERTENSION: ICD-10-CM

## 2025-09-03 LAB
ANION GAP SERPL CALCULATED.4IONS-SCNC: 6 MMOL/L (CALC) (ref 7–17)
BUN SERPL-MCNC: 25 MG/DL (ref 7–25)
BUN/CREAT SERPL: 22 (CALC) (ref 6–22)
CALCIUM SERPL-MCNC: 10.1 MG/DL (ref 8.6–10.4)
CHLORIDE SERPL-SCNC: 100 MMOL/L (ref 98–110)
CO2 SERPL-SCNC: 27 MMOL/L (ref 20–32)
CREAT SERPL-MCNC: 1.12 MG/DL (ref 0.6–0.95)
EGFRCR SERPLBLD CKD-EPI 2021: 50 ML/MIN/1.73M2
ERYTHROCYTE [DISTWIDTH] IN BLOOD BY AUTOMATED COUNT: 13.2 % (ref 11–15)
GLUCOSE SERPL-MCNC: 152 MG/DL (ref 65–99)
HCT VFR BLD AUTO: 43.3 % (ref 35–45)
HGB BLD-MCNC: 13.6 G/DL (ref 11.7–15.5)
INR PPP: NORMAL
MCH RBC QN AUTO: 28 PG (ref 27–33)
MCHC RBC AUTO-ENTMCNC: 31.4 G/DL (ref 32–36)
MCV RBC AUTO: 89.1 FL (ref 80–100)
PLATELET # BLD AUTO: 321 THOUSAND/UL (ref 140–400)
PMV BLD REES-ECKER: 11.5 FL (ref 7.5–12.5)
POTASSIUM SERPL-SCNC: ABNORMAL MMOL/L
RBC # BLD AUTO: 4.86 MILLION/UL (ref 3.8–5.1)
SODIUM SERPL-SCNC: 133 MMOL/L (ref 135–146)
WBC # BLD AUTO: 7 THOUSAND/UL (ref 3.8–10.8)

## 2025-09-04 ENCOUNTER — HOSPITAL ENCOUNTER (OUTPATIENT)
Facility: HOSPITAL | Age: 80
Setting detail: OUTPATIENT SURGERY
Discharge: HOME | End: 2025-09-04
Attending: INTERNAL MEDICINE | Admitting: INTERNAL MEDICINE
Payer: MEDICARE

## 2025-09-04 ENCOUNTER — PHARMACY VISIT (OUTPATIENT)
Dept: PHARMACY | Facility: CLINIC | Age: 80
End: 2025-09-04
Payer: MEDICARE

## 2025-09-04 VITALS
BODY MASS INDEX: 31.28 KG/M2 | DIASTOLIC BLOOD PRESSURE: 71 MMHG | WEIGHT: 169.97 LBS | HEIGHT: 62 IN | SYSTOLIC BLOOD PRESSURE: 155 MMHG | OXYGEN SATURATION: 100 % | RESPIRATION RATE: 18 BRPM | HEART RATE: 111 BPM | TEMPERATURE: 99.3 F

## 2025-09-04 DIAGNOSIS — I20.0 UNSTABLE ANGINA (MULTI): Primary | ICD-10-CM

## 2025-09-04 DIAGNOSIS — R07.9 CHEST PAIN, UNSPECIFIED: ICD-10-CM

## 2025-09-04 DIAGNOSIS — I25.10 CAD IN NATIVE ARTERY: ICD-10-CM

## 2025-09-04 DIAGNOSIS — E78.5 HYPERLIPIDEMIA LDL GOAL <100: ICD-10-CM

## 2025-09-04 LAB — GLUCOSE BLD MANUAL STRIP-MCNC: 124 MG/DL (ref 74–99)

## 2025-09-04 PROCEDURE — 93458 L HRT ARTERY/VENTRICLE ANGIO: CPT | Performed by: INTERNAL MEDICINE

## 2025-09-04 PROCEDURE — 99152 MOD SED SAME PHYS/QHP 5/>YRS: CPT | Performed by: INTERNAL MEDICINE

## 2025-09-04 PROCEDURE — 2550000001 HC RX 255 CONTRASTS: Mod: JW | Performed by: INTERNAL MEDICINE

## 2025-09-04 PROCEDURE — 2500000001 HC RX 250 WO HCPCS SELF ADMINISTERED DRUGS (ALT 637 FOR MEDICARE OP): Performed by: NURSE PRACTITIONER

## 2025-09-04 PROCEDURE — 7100000009 HC PHASE TWO TIME - INITIAL BASE CHARGE: Performed by: INTERNAL MEDICINE

## 2025-09-04 PROCEDURE — 7100000010 HC PHASE TWO TIME - EACH INCREMENTAL 1 MINUTE: Performed by: INTERNAL MEDICINE

## 2025-09-04 PROCEDURE — 7100000002 HC RECOVERY ROOM TIME - EACH INCREMENTAL 1 MINUTE: Performed by: INTERNAL MEDICINE

## 2025-09-04 PROCEDURE — RXMED WILLOW AMBULATORY MEDICATION CHARGE

## 2025-09-04 PROCEDURE — C1760 CLOSURE DEV, VASC: HCPCS | Performed by: INTERNAL MEDICINE

## 2025-09-04 PROCEDURE — 7100000001 HC RECOVERY ROOM TIME - INITIAL BASE CHARGE: Performed by: INTERNAL MEDICINE

## 2025-09-04 PROCEDURE — 82947 ASSAY GLUCOSE BLOOD QUANT: CPT

## 2025-09-04 PROCEDURE — 2720000007 HC OR 272 NO HCPCS: Performed by: INTERNAL MEDICINE

## 2025-09-04 PROCEDURE — 2500000004 HC RX 250 GENERAL PHARMACY W/ HCPCS (ALT 636 FOR OP/ED): Performed by: NURSE PRACTITIONER

## 2025-09-04 PROCEDURE — 96373 THER/PROPH/DIAG INJ IA: CPT | Performed by: INTERNAL MEDICINE

## 2025-09-04 PROCEDURE — C1894 INTRO/SHEATH, NON-LASER: HCPCS | Performed by: INTERNAL MEDICINE

## 2025-09-04 PROCEDURE — 2500000002 HC RX 250 W HCPCS SELF ADMINISTERED DRUGS (ALT 637 FOR MEDICARE OP, ALT 636 FOR OP/ED): Performed by: NURSE PRACTITIONER

## 2025-09-04 PROCEDURE — 99223 1ST HOSP IP/OBS HIGH 75: CPT | Performed by: NURSE PRACTITIONER

## 2025-09-04 PROCEDURE — 2500000004 HC RX 250 GENERAL PHARMACY W/ HCPCS (ALT 636 FOR OP/ED): Performed by: INTERNAL MEDICINE

## 2025-09-04 RX ORDER — DEXTROSE 50 % IN WATER (D50W) INTRAVENOUS SYRINGE
12.5
Status: DISCONTINUED | OUTPATIENT
Start: 2025-09-04 | End: 2025-09-04 | Stop reason: HOSPADM

## 2025-09-04 RX ORDER — SODIUM CHLORIDE 9 MG/ML
3 INJECTION, SOLUTION INTRAVENOUS CONTINUOUS
Status: DISCONTINUED | OUTPATIENT
Start: 2025-09-04 | End: 2025-09-04 | Stop reason: HOSPADM

## 2025-09-04 RX ORDER — METOPROLOL SUCCINATE 25 MG/1
100 TABLET, EXTENDED RELEASE ORAL ONCE
Status: COMPLETED | OUTPATIENT
Start: 2025-09-04 | End: 2025-09-04

## 2025-09-04 RX ORDER — HYDRALAZINE HYDROCHLORIDE 20 MG/ML
INJECTION INTRAMUSCULAR; INTRAVENOUS AS NEEDED
Status: DISCONTINUED | OUTPATIENT
Start: 2025-09-04 | End: 2025-09-04 | Stop reason: HOSPADM

## 2025-09-04 RX ORDER — MIDAZOLAM HYDROCHLORIDE 1 MG/ML
INJECTION, SOLUTION INTRAMUSCULAR; INTRAVENOUS AS NEEDED
Status: DISCONTINUED | OUTPATIENT
Start: 2025-09-04 | End: 2025-09-04 | Stop reason: HOSPADM

## 2025-09-04 RX ORDER — ACETAMINOPHEN 325 MG/1
650 TABLET ORAL EVERY 6 HOURS PRN
Status: DISCONTINUED | OUTPATIENT
Start: 2025-09-04 | End: 2025-09-04 | Stop reason: HOSPADM

## 2025-09-04 RX ORDER — ATORVASTATIN CALCIUM 80 MG/1
80 TABLET, FILM COATED ORAL DAILY
Qty: 90 TABLET | Refills: 0 | Status: SHIPPED | OUTPATIENT
Start: 2025-09-04

## 2025-09-04 RX ORDER — NAPROXEN SODIUM 220 MG/1
81 TABLET, FILM COATED ORAL ONCE
Status: DISCONTINUED | OUTPATIENT
Start: 2025-09-04 | End: 2025-09-04 | Stop reason: HOSPADM

## 2025-09-04 RX ORDER — SODIUM CHLORIDE 9 MG/ML
100 INJECTION, SOLUTION INTRAVENOUS CONTINUOUS
Status: ACTIVE | OUTPATIENT
Start: 2025-09-04 | End: 2025-09-04

## 2025-09-04 RX ORDER — NITROGLYCERIN 40 MG/100ML
INJECTION INTRAVENOUS AS NEEDED
Status: DISCONTINUED | OUTPATIENT
Start: 2025-09-04 | End: 2025-09-04 | Stop reason: HOSPADM

## 2025-09-04 RX ORDER — ACETAMINOPHEN 160 MG/5ML
650 SOLUTION ORAL EVERY 6 HOURS PRN
Status: DISCONTINUED | OUTPATIENT
Start: 2025-09-04 | End: 2025-09-04 | Stop reason: HOSPADM

## 2025-09-04 RX ORDER — HEPARIN SODIUM 1000 [USP'U]/ML
INJECTION, SOLUTION INTRAVENOUS; SUBCUTANEOUS AS NEEDED
Status: DISCONTINUED | OUTPATIENT
Start: 2025-09-04 | End: 2025-09-04 | Stop reason: HOSPADM

## 2025-09-04 RX ORDER — ACETAMINOPHEN 650 MG/1
650 SUPPOSITORY RECTAL EVERY 6 HOURS PRN
Status: DISCONTINUED | OUTPATIENT
Start: 2025-09-04 | End: 2025-09-04 | Stop reason: HOSPADM

## 2025-09-04 RX ORDER — HYDRALAZINE HYDROCHLORIDE 25 MG/1
25 TABLET, FILM COATED ORAL ONCE
Status: COMPLETED | OUTPATIENT
Start: 2025-09-04 | End: 2025-09-04

## 2025-09-04 RX ORDER — DEXTROSE 50 % IN WATER (D50W) INTRAVENOUS SYRINGE
25
Status: DISCONTINUED | OUTPATIENT
Start: 2025-09-04 | End: 2025-09-04 | Stop reason: HOSPADM

## 2025-09-04 RX ORDER — FENTANYL CITRATE 50 UG/ML
INJECTION, SOLUTION INTRAMUSCULAR; INTRAVENOUS AS NEEDED
Status: DISCONTINUED | OUTPATIENT
Start: 2025-09-04 | End: 2025-09-04 | Stop reason: HOSPADM

## 2025-09-04 RX ORDER — CHOLECALCIFEROL (VITAMIN D3) 25 MCG
TABLET ORAL DAILY
COMMUNITY

## 2025-09-04 RX ORDER — LIDOCAINE HYDROCHLORIDE 10 MG/ML
INJECTION, SOLUTION EPIDURAL; INFILTRATION; INTRACAUDAL; PERINEURAL AS NEEDED
Status: DISCONTINUED | OUTPATIENT
Start: 2025-09-04 | End: 2025-09-04 | Stop reason: HOSPADM

## 2025-09-04 RX ADMIN — SODIUM CHLORIDE 100 ML/HR: 0.9 INJECTION, SOLUTION INTRAVENOUS at 10:35

## 2025-09-04 RX ADMIN — METOPROLOL SUCCINATE 100 MG: 25 TABLET, EXTENDED RELEASE ORAL at 14:38

## 2025-09-04 RX ADMIN — HYDRALAZINE HYDROCHLORIDE 25 MG: 25 TABLET ORAL at 12:58

## 2025-09-04 RX ADMIN — VALSARTAN: 160 TABLET, FILM COATED ORAL at 14:52

## 2025-09-04 ASSESSMENT — ENCOUNTER SYMPTOMS
GASTROINTESTINAL NEGATIVE: 1
SHORTNESS OF BREATH: 1
NEUROLOGICAL NEGATIVE: 1
FATIGUE: 1
ENDOCRINE NEGATIVE: 1
HEMATOLOGIC/LYMPHATIC NEGATIVE: 1
PSYCHIATRIC NEGATIVE: 1
MUSCULOSKELETAL NEGATIVE: 1
EYES NEGATIVE: 1
ALLERGIC/IMMUNOLOGIC NEGATIVE: 1

## 2025-09-04 ASSESSMENT — PAIN - FUNCTIONAL ASSESSMENT
PAIN_FUNCTIONAL_ASSESSMENT: 0-10

## 2025-09-04 ASSESSMENT — PAIN SCALES - GENERAL
PAINLEVEL_OUTOF10: 0 - NO PAIN

## 2025-09-05 ENCOUNTER — TELEPHONE (OUTPATIENT)
Dept: CARDIOLOGY | Facility: CLINIC | Age: 80
End: 2025-09-05
Payer: MEDICARE

## 2025-11-06 ENCOUNTER — APPOINTMENT (OUTPATIENT)
Dept: OPHTHALMOLOGY | Facility: CLINIC | Age: 80
End: 2025-11-06
Payer: MEDICARE

## 2026-01-06 ENCOUNTER — APPOINTMENT (OUTPATIENT)
Dept: PRIMARY CARE | Facility: CLINIC | Age: 81
End: 2026-01-06
Payer: MEDICARE

## (undated) DEVICE — GUIDEWIRE, INQWIRE, 3MM J, .035 X 210CM, FIXED

## (undated) DEVICE — CATHETER, ANGIO, IMPULSE, FL3.5, 5 FR X 100 CM

## (undated) DEVICE — INTRODUCER, GLIDESHEATH SLENDER A-KIT, 6FR 10CM

## (undated) DEVICE — TR BAND, RADIAL COMPRESSION, STANDARD, 24CM

## (undated) DEVICE — CATHETER, ANGIO, IMPULSE, FR4, 5 FR X 100 CM